# Patient Record
Sex: FEMALE | Race: WHITE | Employment: OTHER
[De-identification: names, ages, dates, MRNs, and addresses within clinical notes are randomized per-mention and may not be internally consistent; named-entity substitution may affect disease eponyms.]

---

## 2017-01-06 ENCOUNTER — CHARTING TRANS (OUTPATIENT)
Dept: OTHER | Age: 77
End: 2017-01-06

## 2017-01-10 ENCOUNTER — CHARTING TRANS (OUTPATIENT)
Dept: FAMILY MEDICINE | Age: 77
End: 2017-01-10

## 2017-01-17 ENCOUNTER — BH HISTORICAL (OUTPATIENT)
Dept: OTHER | Age: 77
End: 2017-01-17

## 2017-01-18 ENCOUNTER — CHARTING TRANS (OUTPATIENT)
Dept: OTHER | Age: 77
End: 2017-01-18

## 2017-01-25 ENCOUNTER — BH HISTORICAL (OUTPATIENT)
Dept: OTHER | Age: 77
End: 2017-01-25

## 2017-01-25 ENCOUNTER — CHARTING TRANS (OUTPATIENT)
Dept: OTHER | Age: 77
End: 2017-01-25

## 2017-02-01 ENCOUNTER — CHARTING TRANS (OUTPATIENT)
Dept: OTHER | Age: 77
End: 2017-02-01

## 2017-02-02 ENCOUNTER — CHARTING TRANS (OUTPATIENT)
Dept: FAMILY MEDICINE | Age: 77
End: 2017-02-02

## 2017-02-02 ENCOUNTER — IMAGING SERVICES (OUTPATIENT)
Dept: OTHER | Age: 77
End: 2017-02-02

## 2017-02-11 ENCOUNTER — IMAGING SERVICES (OUTPATIENT)
Dept: OTHER | Age: 77
End: 2017-02-11

## 2017-02-15 ENCOUNTER — CHARTING TRANS (OUTPATIENT)
Dept: OTHER | Age: 77
End: 2017-02-15

## 2017-02-21 ENCOUNTER — BH HISTORICAL (OUTPATIENT)
Dept: OTHER | Age: 77
End: 2017-02-21

## 2017-02-22 ENCOUNTER — CHARTING TRANS (OUTPATIENT)
Dept: OTHER | Age: 77
End: 2017-02-22

## 2017-02-23 ENCOUNTER — BH HISTORICAL (OUTPATIENT)
Dept: OTHER | Age: 77
End: 2017-02-23

## 2017-02-25 ENCOUNTER — CHARTING TRANS (OUTPATIENT)
Dept: OTHER | Age: 77
End: 2017-02-25

## 2017-03-02 ENCOUNTER — CHARTING TRANS (OUTPATIENT)
Dept: OTHER | Age: 77
End: 2017-03-02

## 2017-03-06 ENCOUNTER — CHARTING TRANS (OUTPATIENT)
Dept: OTHER | Age: 77
End: 2017-03-06

## 2017-03-06 ENCOUNTER — CHARTING TRANS (OUTPATIENT)
Dept: FAMILY MEDICINE | Age: 77
End: 2017-03-06

## 2017-03-21 ENCOUNTER — CHARTING TRANS (OUTPATIENT)
Dept: OTHER | Age: 77
End: 2017-03-21

## 2017-03-31 ENCOUNTER — CHARTING TRANS (OUTPATIENT)
Dept: FAMILY MEDICINE | Age: 77
End: 2017-03-31

## 2017-04-03 ENCOUNTER — CHARTING TRANS (OUTPATIENT)
Dept: OTHER | Age: 77
End: 2017-04-03

## 2017-04-04 ENCOUNTER — CHARTING TRANS (OUTPATIENT)
Dept: OTHER | Age: 77
End: 2017-04-04

## 2017-04-25 ENCOUNTER — CHARTING TRANS (OUTPATIENT)
Dept: OTHER | Age: 77
End: 2017-04-25

## 2017-04-27 ENCOUNTER — CHARTING TRANS (OUTPATIENT)
Dept: OTHER | Age: 77
End: 2017-04-27

## 2017-05-03 ENCOUNTER — CHARTING TRANS (OUTPATIENT)
Dept: OTHER | Age: 77
End: 2017-05-03

## 2017-06-09 ENCOUNTER — CHARTING TRANS (OUTPATIENT)
Dept: FAMILY MEDICINE | Age: 77
End: 2017-06-09

## 2017-06-19 ENCOUNTER — CHARTING TRANS (OUTPATIENT)
Dept: OTHER | Age: 77
End: 2017-06-19

## 2017-06-20 ENCOUNTER — CHARTING TRANS (OUTPATIENT)
Dept: OTHER | Age: 77
End: 2017-06-20

## 2017-06-29 ENCOUNTER — CHARTING TRANS (OUTPATIENT)
Dept: OTHER | Age: 77
End: 2017-06-29

## 2017-07-13 ENCOUNTER — CHARTING TRANS (OUTPATIENT)
Dept: OTHER | Age: 77
End: 2017-07-13

## 2017-07-13 ENCOUNTER — CHARTING TRANS (OUTPATIENT)
Dept: FAMILY MEDICINE | Age: 77
End: 2017-07-13

## 2017-08-31 ENCOUNTER — CHARTING TRANS (OUTPATIENT)
Dept: OTHER | Age: 77
End: 2017-08-31

## 2017-09-05 ENCOUNTER — CHARTING TRANS (OUTPATIENT)
Dept: OTHER | Age: 77
End: 2017-09-05

## 2017-09-07 ENCOUNTER — CHARTING TRANS (OUTPATIENT)
Dept: OTHER | Age: 77
End: 2017-09-07

## 2017-09-11 ENCOUNTER — CHARTING TRANS (OUTPATIENT)
Dept: OTHER | Age: 77
End: 2017-09-11

## 2017-09-14 ENCOUNTER — CHARTING TRANS (OUTPATIENT)
Dept: OTHER | Age: 77
End: 2017-09-14

## 2017-10-27 ENCOUNTER — LAB REQUISITION (OUTPATIENT)
Dept: LAB | Facility: HOSPITAL | Age: 77
End: 2017-10-27

## 2017-10-27 ENCOUNTER — CHARTING TRANS (OUTPATIENT)
Dept: OTHER | Age: 77
End: 2017-10-27

## 2017-10-27 DIAGNOSIS — R50.9 FEVER: ICD-10-CM

## 2017-10-27 PROCEDURE — 80053 COMPREHEN METABOLIC PANEL: CPT

## 2017-10-27 PROCEDURE — 85025 COMPLETE CBC W/AUTO DIFF WBC: CPT

## 2017-10-27 PROCEDURE — 83735 ASSAY OF MAGNESIUM: CPT

## 2017-11-16 ENCOUNTER — CHARTING TRANS (OUTPATIENT)
Dept: OTHER | Age: 77
End: 2017-11-16

## 2017-11-30 ENCOUNTER — CHARTING TRANS (OUTPATIENT)
Dept: OTHER | Age: 77
End: 2017-11-30

## 2017-12-04 ENCOUNTER — CHARTING TRANS (OUTPATIENT)
Dept: OTHER | Age: 77
End: 2017-12-04

## 2018-01-18 ENCOUNTER — CHARTING TRANS (OUTPATIENT)
Dept: OTHER | Age: 78
End: 2018-01-18

## 2018-01-22 ENCOUNTER — CHARTING TRANS (OUTPATIENT)
Dept: OTHER | Age: 78
End: 2018-01-22

## 2018-02-02 ENCOUNTER — CHARTING TRANS (OUTPATIENT)
Dept: OTHER | Age: 78
End: 2018-02-02

## 2018-02-15 ENCOUNTER — CHARTING TRANS (OUTPATIENT)
Dept: OTHER | Age: 78
End: 2018-02-15

## 2018-03-21 ENCOUNTER — CHARTING TRANS (OUTPATIENT)
Dept: OTHER | Age: 78
End: 2018-03-21

## 2018-04-02 ENCOUNTER — CHARTING TRANS (OUTPATIENT)
Dept: OTHER | Age: 78
End: 2018-04-02

## 2018-04-13 ENCOUNTER — CHARTING TRANS (OUTPATIENT)
Dept: OTHER | Age: 78
End: 2018-04-13

## 2018-04-26 ENCOUNTER — CHARTING TRANS (OUTPATIENT)
Dept: OTHER | Age: 78
End: 2018-04-26

## 2018-05-01 ENCOUNTER — CHARTING TRANS (OUTPATIENT)
Dept: OTHER | Age: 78
End: 2018-05-01

## 2018-05-04 ENCOUNTER — CHARTING TRANS (OUTPATIENT)
Dept: OTHER | Age: 78
End: 2018-05-04

## 2018-05-07 ENCOUNTER — CHARTING TRANS (OUTPATIENT)
Dept: OTHER | Age: 78
End: 2018-05-07

## 2018-05-08 ENCOUNTER — BH HISTORICAL (OUTPATIENT)
Dept: OTHER | Age: 78
End: 2018-05-08

## 2018-05-15 ENCOUNTER — BH HISTORICAL (OUTPATIENT)
Dept: OTHER | Age: 78
End: 2018-05-15

## 2018-05-21 ENCOUNTER — BH HISTORICAL (OUTPATIENT)
Dept: OTHER | Age: 78
End: 2018-05-21

## 2018-05-30 ENCOUNTER — CHARTING TRANS (OUTPATIENT)
Dept: OTHER | Age: 78
End: 2018-05-30

## 2018-06-02 ENCOUNTER — BH HISTORICAL (OUTPATIENT)
Dept: OTHER | Age: 78
End: 2018-06-02

## 2018-06-15 ENCOUNTER — BH HISTORICAL (OUTPATIENT)
Dept: OTHER | Age: 78
End: 2018-06-15

## 2018-06-16 ENCOUNTER — CHARTING TRANS (OUTPATIENT)
Dept: OTHER | Age: 78
End: 2018-06-16

## 2018-06-25 ENCOUNTER — CHARTING TRANS (OUTPATIENT)
Dept: OTHER | Age: 78
End: 2018-06-25

## 2018-06-29 ENCOUNTER — BH HISTORICAL (OUTPATIENT)
Dept: OTHER | Age: 78
End: 2018-06-29

## 2018-07-13 ENCOUNTER — BH HISTORICAL (OUTPATIENT)
Dept: OTHER | Age: 78
End: 2018-07-13

## 2018-07-24 ENCOUNTER — CHARTING TRANS (OUTPATIENT)
Dept: OTHER | Age: 78
End: 2018-07-24

## 2018-07-27 ENCOUNTER — BH HISTORICAL (OUTPATIENT)
Dept: OTHER | Age: 78
End: 2018-07-27

## 2018-08-06 ENCOUNTER — CHARTING TRANS (OUTPATIENT)
Dept: OTHER | Age: 78
End: 2018-08-06

## 2018-08-13 ENCOUNTER — CHARTING TRANS (OUTPATIENT)
Dept: OTHER | Age: 78
End: 2018-08-13

## 2018-08-20 ENCOUNTER — BH HISTORICAL (OUTPATIENT)
Dept: OTHER | Age: 78
End: 2018-08-20

## 2018-08-29 ENCOUNTER — BH HISTORICAL (OUTPATIENT)
Dept: OTHER | Age: 78
End: 2018-08-29

## 2018-08-30 ENCOUNTER — CHARTING TRANS (OUTPATIENT)
Dept: OTHER | Age: 78
End: 2018-08-30

## 2018-09-11 ENCOUNTER — CHARTING TRANS (OUTPATIENT)
Dept: OTHER | Age: 78
End: 2018-09-11

## 2018-10-15 ENCOUNTER — CHARTING TRANS (OUTPATIENT)
Dept: OTHER | Age: 78
End: 2018-10-15

## 2018-10-19 ENCOUNTER — CHARTING TRANS (OUTPATIENT)
Dept: OTHER | Age: 78
End: 2018-10-19

## 2018-11-23 ENCOUNTER — IMAGING SERVICES (OUTPATIENT)
Dept: OTHER | Age: 78
End: 2018-11-23

## 2018-11-27 ENCOUNTER — CHARTING TRANS (OUTPATIENT)
Dept: OTHER | Age: 78
End: 2018-11-27

## 2018-11-27 VITALS
DIASTOLIC BLOOD PRESSURE: 68 MMHG | OXYGEN SATURATION: 96 % | HEIGHT: 68 IN | HEART RATE: 60 BPM | SYSTOLIC BLOOD PRESSURE: 122 MMHG | TEMPERATURE: 97.3 F | WEIGHT: 132 LBS | BODY MASS INDEX: 20 KG/M2 | RESPIRATION RATE: 14 BRPM

## 2018-11-27 VITALS
SYSTOLIC BLOOD PRESSURE: 118 MMHG | TEMPERATURE: 98.2 F | WEIGHT: 137 LBS | DIASTOLIC BLOOD PRESSURE: 68 MMHG | OXYGEN SATURATION: 98 % | RESPIRATION RATE: 18 BRPM | HEART RATE: 61 BPM

## 2018-11-28 VITALS
WEIGHT: 121 LBS | SYSTOLIC BLOOD PRESSURE: 124 MMHG | HEART RATE: 72 BPM | TEMPERATURE: 98 F | RESPIRATION RATE: 20 BRPM | DIASTOLIC BLOOD PRESSURE: 72 MMHG | OXYGEN SATURATION: 98 %

## 2018-11-28 VITALS
SYSTOLIC BLOOD PRESSURE: 140 MMHG | OXYGEN SATURATION: 93 % | WEIGHT: 128 LBS | HEART RATE: 79 BPM | DIASTOLIC BLOOD PRESSURE: 68 MMHG | RESPIRATION RATE: 18 BRPM | TEMPERATURE: 97.5 F

## 2018-11-28 VITALS
OXYGEN SATURATION: 95 % | WEIGHT: 130 LBS | SYSTOLIC BLOOD PRESSURE: 141 MMHG | TEMPERATURE: 98.1 F | HEART RATE: 74 BPM | DIASTOLIC BLOOD PRESSURE: 78 MMHG | BODY MASS INDEX: 19.7 KG/M2 | HEIGHT: 68 IN | RESPIRATION RATE: 18 BRPM

## 2018-11-28 VITALS
TEMPERATURE: 98.3 F | DIASTOLIC BLOOD PRESSURE: 84 MMHG | SYSTOLIC BLOOD PRESSURE: 142 MMHG | HEART RATE: 76 BPM | WEIGHT: 127 LBS

## 2018-11-28 VITALS
SYSTOLIC BLOOD PRESSURE: 142 MMHG | DIASTOLIC BLOOD PRESSURE: 68 MMHG | WEIGHT: 123 LBS | RESPIRATION RATE: 28 BRPM | HEART RATE: 84 BPM | TEMPERATURE: 98.9 F

## 2018-11-28 VITALS
SYSTOLIC BLOOD PRESSURE: 138 MMHG | DIASTOLIC BLOOD PRESSURE: 74 MMHG | RESPIRATION RATE: 24 BRPM | HEART RATE: 92 BPM | TEMPERATURE: 97.1 F | WEIGHT: 125 LBS

## 2018-11-29 VITALS
DIASTOLIC BLOOD PRESSURE: 78 MMHG | TEMPERATURE: 98.8 F | HEART RATE: 80 BPM | RESPIRATION RATE: 18 BRPM | WEIGHT: 122 LBS | SYSTOLIC BLOOD PRESSURE: 138 MMHG

## 2018-11-29 VITALS
HEIGHT: 66 IN | BODY MASS INDEX: 19.93 KG/M2 | DIASTOLIC BLOOD PRESSURE: 76 MMHG | RESPIRATION RATE: 16 BRPM | WEIGHT: 124 LBS | SYSTOLIC BLOOD PRESSURE: 122 MMHG | HEART RATE: 80 BPM | TEMPERATURE: 98.2 F | OXYGEN SATURATION: 96 %

## 2018-12-09 PROBLEM — F33.9 RECURRENT MAJOR DEPRESSIVE DISORDER (CMD): Status: ACTIVE | Noted: 2018-12-09

## 2018-12-09 RX ORDER — CLONAZEPAM 0.5 MG/1
1 TABLET ORAL 3 TIMES DAILY PRN
COMMUNITY
Start: 2018-11-27 | End: 2019-01-01 | Stop reason: SDUPTHER

## 2018-12-09 RX ORDER — MEMANTINE HYDROCHLORIDE 5 MG/1
5 TABLET ORAL DAILY
COMMUNITY
Start: 2018-09-11

## 2018-12-09 RX ORDER — RISPERIDONE 0.5 MG/1
0.5 TABLET ORAL NIGHTLY
COMMUNITY
Start: 2018-09-11 | End: 2018-12-11 | Stop reason: ALTCHOICE

## 2018-12-09 RX ORDER — MIRTAZAPINE 30 MG/1
30 TABLET, FILM COATED ORAL NIGHTLY
COMMUNITY
Start: 2018-09-11

## 2018-12-10 SDOH — HEALTH STABILITY: MENTAL HEALTH: HOW OFTEN DO YOU HAVE A DRINK CONTAINING ALCOHOL?: NEVER

## 2018-12-11 ENCOUNTER — LAB SERVICES (OUTPATIENT)
Dept: LAB | Age: 78
End: 2018-12-11

## 2018-12-11 ENCOUNTER — OFFICE VISIT (OUTPATIENT)
Dept: FAMILY MEDICINE | Age: 78
End: 2018-12-11

## 2018-12-11 VITALS
BODY MASS INDEX: 22.2 KG/M2 | WEIGHT: 146 LBS | SYSTOLIC BLOOD PRESSURE: 140 MMHG | HEART RATE: 72 BPM | DIASTOLIC BLOOD PRESSURE: 78 MMHG | TEMPERATURE: 98.1 F

## 2018-12-11 DIAGNOSIS — F33.2 SEVERE EPISODE OF RECURRENT MAJOR DEPRESSIVE DISORDER, WITHOUT PSYCHOTIC FEATURES (CMD): Primary | ICD-10-CM

## 2018-12-11 DIAGNOSIS — E04.1 NONTOXIC SINGLE THYROID NODULE: ICD-10-CM

## 2018-12-11 DIAGNOSIS — R26.89 BALANCE PROBLEM: ICD-10-CM

## 2018-12-11 DIAGNOSIS — F41.8 ANXIETY ASSOCIATED WITH DEPRESSION: ICD-10-CM

## 2018-12-11 DIAGNOSIS — E21.3 HYPERPARATHYROIDISM (CMD): ICD-10-CM

## 2018-12-11 DIAGNOSIS — Z51.81 MEDICATION MONITORING ENCOUNTER: ICD-10-CM

## 2018-12-11 DIAGNOSIS — R41.3 MEMORY CHANGE: ICD-10-CM

## 2018-12-11 DIAGNOSIS — E04.1 THYROID NODULE: ICD-10-CM

## 2018-12-11 LAB
ALBUMIN SERPL-MCNC: 4.4 G/DL (ref 3.6–5.1)
ALP SERPL-CCNC: 100 U/L (ref 45–130)
ALT SERPL W/O P-5'-P-CCNC: 19 U/L (ref 4–38)
AST SERPL-CCNC: 22 U/L (ref 14–43)
BILIRUB SERPL-MCNC: 0.4 MG/DL (ref 0–1.3)
BUN SERPL-MCNC: 19 MG/DL (ref 7–20)
CALCIUM SERPL-MCNC: 10.6 MG/DL (ref 8.6–10.6)
CHLORIDE SERPL-SCNC: 104 MMOL/L (ref 96–107)
CO2 SERPL-SCNC: 30 MMOL/L (ref 22–32)
CREAT SERPL-MCNC: 0.7 MG/DL (ref 0.5–1.4)
DIFFERENTIAL TYPE: ABNORMAL
GFR SERPL CREATININE-BSD FRML MDRD: >60 ML/MIN/{1.73M2}
GFR SERPL CREATININE-BSD FRML MDRD: >60 ML/MIN/{1.73M2}
GLUCOSE SERPL-MCNC: 70 MG/DL (ref 70–200)
HEMATOCRIT: 41.1 % (ref 34–45)
HEMOGLOBIN: 13.8 G/DL (ref 11.2–15.7)
LYMPH PERCENT: 12.9 % (ref 20.5–51.1)
LYMPHOCYTE ABSOLUTE #: 1.2 10*3/UL (ref 1.2–3.4)
MEAN CORPUSCULAR HGB CONCENTRATION: 33.6 % (ref 32–36)
MEAN CORPUSCULAR HGB: 29.9 PG (ref 27–34)
MEAN CORPUSCULAR VOLUME: 89.2 FL (ref 79–95)
MEAN PLATELET VOLUME: 8.9 FL (ref 8.6–12.4)
MIXED %: 9 % (ref 4.3–12.9)
MIXED ABSOLUTE #: 0.9 10*3/UL (ref 0.2–0.9)
NEUTROPHIL ABSOLUTE #: 7.4 10*3/UL (ref 1.4–6.5)
NEUTROPHIL PERCENT: 78.1 % (ref 34–73.5)
PLATELET COUNT: 288 10*3/UL (ref 150–400)
POTASSIUM SERPL-SCNC: 4.8 MMOL/L (ref 3.5–5.3)
PROT SERPL-MCNC: 6.9 G/DL (ref 6.4–8.5)
RED BLOOD CELL COUNT: 4.61 10*6/UL (ref 3.7–5.2)
RED CELL DISTRIBUTION WIDTH: 12.3 % (ref 11.3–14.8)
SODIUM SERPL-SCNC: 141 MMOL/L (ref 136–146)
WHITE BLOOD CELL COUNT: 9.5 10*3/UL (ref 4–10)

## 2018-12-11 PROCEDURE — 36415 COLL VENOUS BLD VENIPUNCTURE: CPT | Performed by: FAMILY MEDICINE

## 2018-12-11 PROCEDURE — 83970 ASSAY OF PARATHORMONE: CPT | Performed by: FAMILY MEDICINE

## 2018-12-11 PROCEDURE — 84443 ASSAY THYROID STIM HORMONE: CPT | Performed by: FAMILY MEDICINE

## 2018-12-11 PROCEDURE — 80053 COMPREHEN METABOLIC PANEL: CPT | Performed by: FAMILY MEDICINE

## 2018-12-11 PROCEDURE — 99214 OFFICE O/P EST MOD 30 MIN: CPT | Performed by: FAMILY MEDICINE

## 2018-12-11 PROCEDURE — 85025 COMPLETE CBC W/AUTO DIFF WBC: CPT | Performed by: FAMILY MEDICINE

## 2018-12-11 PROCEDURE — 3078F DIAST BP <80 MM HG: CPT | Performed by: FAMILY MEDICINE

## 2018-12-11 RX ORDER — LAMOTRIGINE 25 MG/1
25 TABLET ORAL DAILY
Qty: 30 TABLET | Refills: 0 | Status: SHIPPED | OUTPATIENT
Start: 2018-12-11 | End: 2018-12-31 | Stop reason: SDUPTHER

## 2018-12-11 SDOH — HEALTH STABILITY: PHYSICAL HEALTH: ON AVERAGE, HOW MANY MINUTES DO YOU ENGAGE IN EXERCISE AT THIS LEVEL?: 20 MIN

## 2018-12-11 SDOH — HEALTH STABILITY: PHYSICAL HEALTH: ON AVERAGE, HOW MANY DAYS PER WEEK DO YOU ENGAGE IN MODERATE TO STRENUOUS EXERCISE (LIKE A BRISK WALK)?: 3 DAYS

## 2018-12-12 LAB
CALCIUM SERPL-MCNC: 10.9 MG/DL (ref 8.6–10.6)
PTH-INTACT SERPL-MCNC: 118.3 PG/ML (ref 23–73)
TSH SERPL DL<=0.05 MIU/L-ACNC: 1.69 M[IU]/L (ref 0.3–4.82)

## 2018-12-15 DIAGNOSIS — R79.89 ELEVATED PARATHYROID HORMONE RELATED PEPTIDE LEVEL: Primary | ICD-10-CM

## 2018-12-15 DIAGNOSIS — R79.89 ELEVATED PARATHYROID HORMONE: ICD-10-CM

## 2018-12-21 ENCOUNTER — IMAGING SERVICES (OUTPATIENT)
Dept: GENERAL RADIOLOGY | Age: 78
End: 2018-12-21
Attending: FAMILY MEDICINE

## 2018-12-21 DIAGNOSIS — D35.1 PARATHYROID ADENOMA: Primary | ICD-10-CM

## 2018-12-21 DIAGNOSIS — R79.89 ELEVATED PARATHYROID HORMONE: ICD-10-CM

## 2018-12-21 PROCEDURE — 78071 PARATHYRD PLANAR W/WO SUBTRJ: CPT | Performed by: RADIOLOGY

## 2018-12-21 PROCEDURE — A9500 TC99M SESTAMIBI: HCPCS

## 2018-12-21 RX ORDER — TETRAKIS(2-METHOXYISOBUTYLISOCYANIDE)COPPER(I) TETRAFLUOROBORATE 1 MG/ML
24 INJECTION, POWDER, LYOPHILIZED, FOR SOLUTION INTRAVENOUS ONCE
Status: COMPLETED | OUTPATIENT
Start: 2018-12-21 | End: 2018-12-21

## 2018-12-21 RX ADMIN — TETRAKIS(2-METHOXYISOBUTYLISOCYANIDE)COPPER(I) TETRAFLUOROBORATE 24 MILLICURIE: 1 INJECTION, POWDER, LYOPHILIZED, FOR SOLUTION INTRAVENOUS at 09:50

## 2018-12-31 ENCOUNTER — OFFICE VISIT (OUTPATIENT)
Dept: FAMILY MEDICINE | Age: 78
End: 2018-12-31

## 2018-12-31 VITALS
RESPIRATION RATE: 16 BRPM | TEMPERATURE: 98 F | WEIGHT: 146.06 LBS | BODY MASS INDEX: 22.21 KG/M2 | DIASTOLIC BLOOD PRESSURE: 80 MMHG | SYSTOLIC BLOOD PRESSURE: 140 MMHG | HEART RATE: 76 BPM

## 2018-12-31 DIAGNOSIS — F33.2 SEVERE EPISODE OF RECURRENT MAJOR DEPRESSIVE DISORDER, WITHOUT PSYCHOTIC FEATURES (CMD): ICD-10-CM

## 2018-12-31 DIAGNOSIS — F41.8 ANXIETY ASSOCIATED WITH DEPRESSION: Primary | ICD-10-CM

## 2018-12-31 DIAGNOSIS — Z23 NEED FOR INFLUENZA VACCINATION: ICD-10-CM

## 2018-12-31 DIAGNOSIS — E21.3 HYPERPARATHYROIDISM (CMD): ICD-10-CM

## 2018-12-31 PROCEDURE — 99214 OFFICE O/P EST MOD 30 MIN: CPT | Performed by: FAMILY MEDICINE

## 2018-12-31 PROCEDURE — 90686 IIV4 VACC NO PRSV 0.5 ML IM: CPT

## 2018-12-31 PROCEDURE — G0008 ADMIN INFLUENZA VIRUS VAC: HCPCS

## 2018-12-31 RX ORDER — LAMOTRIGINE 25 MG/1
25 TABLET ORAL 2 TIMES DAILY
Qty: 60 TABLET | Refills: 0 | Status: SHIPPED | OUTPATIENT
Start: 2018-12-31

## 2018-12-31 ASSESSMENT — PATIENT HEALTH QUESTIONNAIRE - PHQ9
1. LITTLE INTEREST OR PLEASURE IN DOING THINGS: SEVERAL DAYS
SUM OF ALL RESPONSES TO PHQ9 QUESTIONS 1 AND 2: 2
2. FEELING DOWN, DEPRESSED OR HOPELESS: SEVERAL DAYS

## 2019-01-01 ENCOUNTER — EXTERNAL RECORD (OUTPATIENT)
Dept: HEALTH INFORMATION MANAGEMENT | Facility: OTHER | Age: 79
End: 2019-01-01

## 2019-01-01 RX ORDER — CLONAZEPAM 0.5 MG/1
TABLET ORAL
Qty: 90 TABLET | Refills: 0 | Status: SHIPPED | OUTPATIENT
Start: 2019-01-01

## 2019-01-08 ENCOUNTER — OFFICE VISIT (OUTPATIENT)
Dept: OPHTHALMOLOGY | Age: 79
End: 2019-01-08

## 2019-01-08 DIAGNOSIS — H25.12 CATARACT, NUCLEAR SCLEROTIC SENILE, LEFT: Primary | ICD-10-CM

## 2019-01-08 DIAGNOSIS — H04.123 DRY EYES, BILATERAL: ICD-10-CM

## 2019-01-08 PROCEDURE — 92004 COMPRE OPH EXAM NEW PT 1/>: CPT | Performed by: OPHTHALMOLOGY

## 2019-01-09 ENCOUNTER — OFFICE VISIT (OUTPATIENT)
Dept: OTOLARYNGOLOGY | Age: 79
End: 2019-01-09
Attending: FAMILY MEDICINE

## 2019-01-09 VITALS — HEIGHT: 69 IN | BODY MASS INDEX: 21.48 KG/M2 | WEIGHT: 145 LBS

## 2019-01-09 DIAGNOSIS — E21.3 HYPERPARATHYROIDISM (CMD): Primary | ICD-10-CM

## 2019-01-09 PROCEDURE — 99204 OFFICE O/P NEW MOD 45 MIN: CPT | Performed by: OTOLARYNGOLOGY

## 2019-01-11 ENCOUNTER — APPOINTMENT (OUTPATIENT)
Dept: OPTOMETRY | Age: 79
End: 2019-01-11

## 2019-01-14 ENCOUNTER — TELEPHONE (OUTPATIENT)
Dept: FAMILY MEDICINE | Age: 79
End: 2019-01-14

## 2019-01-15 ENCOUNTER — TELEPHONE (OUTPATIENT)
Dept: FAMILY MEDICINE | Age: 79
End: 2019-01-15

## 2019-01-15 ENCOUNTER — NURSE ONLY (OUTPATIENT)
Dept: FAMILY MEDICINE | Age: 79
End: 2019-01-15

## 2019-01-15 DIAGNOSIS — J18.9 PNEUMONIA OF RIGHT LOWER LOBE DUE TO INFECTIOUS ORGANISM: Primary | ICD-10-CM

## 2019-01-15 DIAGNOSIS — R41.3 MEMORY DEFICIT: Primary | ICD-10-CM

## 2019-01-15 DIAGNOSIS — Z11.1 SCREENING EXAMINATION FOR PULMONARY TUBERCULOSIS: ICD-10-CM

## 2019-01-15 PROCEDURE — 86580 TB INTRADERMAL TEST: CPT

## 2019-01-15 PROCEDURE — 99211 OFF/OP EST MAY X REQ PHY/QHP: CPT | Performed by: FAMILY MEDICINE

## 2019-01-15 RX ORDER — AZITHROMYCIN 250 MG/1
TABLET, FILM COATED ORAL
Qty: 6 TABLET | Refills: 0 | Status: SHIPPED | OUTPATIENT
Start: 2019-01-15

## 2019-01-17 ENCOUNTER — APPOINTMENT (OUTPATIENT)
Dept: ULTRASOUND IMAGING | Age: 79
End: 2019-01-17

## 2019-01-17 LAB
INDURATION: 0 MM (ref 0–?)
SKIN TEST RESULT: NEGATIVE

## 2019-01-22 ENCOUNTER — APPOINTMENT (OUTPATIENT)
Dept: FAMILY MEDICINE | Age: 79
End: 2019-01-22

## 2019-03-05 VITALS
RESPIRATION RATE: 18 BRPM | HEART RATE: 72 BPM | WEIGHT: 136 LBS | BODY MASS INDEX: 20.68 KG/M2 | DIASTOLIC BLOOD PRESSURE: 86 MMHG | TEMPERATURE: 98.4 F | SYSTOLIC BLOOD PRESSURE: 142 MMHG

## 2019-03-06 VITALS
HEART RATE: 84 BPM | DIASTOLIC BLOOD PRESSURE: 82 MMHG | OXYGEN SATURATION: 98 % | SYSTOLIC BLOOD PRESSURE: 124 MMHG | RESPIRATION RATE: 20 BRPM | WEIGHT: 137 LBS | BODY MASS INDEX: 20.83 KG/M2 | TEMPERATURE: 98 F

## 2019-03-06 VITALS
HEART RATE: 76 BPM | BODY MASS INDEX: 20.22 KG/M2 | RESPIRATION RATE: 18 BRPM | TEMPERATURE: 98.2 F | DIASTOLIC BLOOD PRESSURE: 84 MMHG | WEIGHT: 133 LBS | OXYGEN SATURATION: 96 % | SYSTOLIC BLOOD PRESSURE: 140 MMHG

## 2019-03-06 VITALS
BODY MASS INDEX: 22.02 KG/M2 | DIASTOLIC BLOOD PRESSURE: 80 MMHG | RESPIRATION RATE: 24 BRPM | SYSTOLIC BLOOD PRESSURE: 134 MMHG | WEIGHT: 137 LBS | HEART RATE: 88 BPM | OXYGEN SATURATION: 98 % | HEIGHT: 66 IN | TEMPERATURE: 98.7 F

## 2019-03-06 VITALS
TEMPERATURE: 97.1 F | DIASTOLIC BLOOD PRESSURE: 84 MMHG | SYSTOLIC BLOOD PRESSURE: 128 MMHG | RESPIRATION RATE: 16 BRPM | OXYGEN SATURATION: 95 % | HEART RATE: 81 BPM

## 2019-03-10 ENCOUNTER — EXTERNAL RECORD (OUTPATIENT)
Dept: OTHER | Age: 79
End: 2019-03-10

## 2019-03-10 ENCOUNTER — EXTERNAL RECORD (OUTPATIENT)
Dept: NEPHROLOGY | Age: 79
End: 2019-03-10

## 2019-03-10 LAB
ANION GAP: 8 MEQ/L (ref 8–16)
BLOOD UREA NITROGEN (BUN): 73 MG/DL (ref 7–25)
BUN/CREATININE RATIO: 41.5 (ref 7.4–23)
CALCIUM, SERUM: 10.1 MG/DL (ref 8.6–10.3)
CARBON DIOXIDE: 31 MMOL/L (ref 21–31)
CHLORIDE, SERUM: 122 MMOL/L (ref 98–107)
CREATININE: 1.76 MG/DL (ref 0.6–1.2)
EST GLOMERULAR FILTRATION RATE: 28 1.73M SQ
GLUCOSE: 109 MG/DL (ref 70–99)
K (POTASSIUM, SERUM): 3.9 MMOL/L (ref 3.5–5.1)
NA (SODIUM, SERUM): 161 MMOL/L (ref 133–144)

## 2019-03-11 LAB
*MEAN CORPUSCULAR HGB CONC: 32.4 G/DL (ref 32.5–35.8)
A/G RATIO: 1.5 (ref 1.1–2.4)
ALANINE AMINOTRANSFE: 45 U/L (ref 7–52)
ALBUMIN, SERUM (ALB): 3.9 G/DL (ref 3.5–5.7)
ALKALINE PHOSPHATASE (ALK): 99 U/L (ref 34–104)
ANION GAP: 6 MEQ/L (ref 8–16)
ANION GAP: 7 MEQ/L (ref 8–16)
ANION GAP: 8 MEQ/L (ref 8–16)
ASPARTATE AMINOTRANS: 35 U/L (ref 13–39)
BASOPHIL AUTOMATED: 0.4 %
BASOPHILS: 0 (ref 0–0.2)
BILIRUBIN, DIRECT (CONJUGATED): 0.14 MG/DL (ref 0.03–0.18)
BILIRUBIN, TOTAL: 0.6 MG/DL (ref 0.2–0.8)
BLOOD UREA NITROGEN (BUN): 44 MG/DL (ref 7–25)
BLOOD UREA NITROGEN (BUN): 50 MG/DL (ref 7–25)
BLOOD UREA NITROGEN (BUN): 58 MG/DL (ref 7–25)
BUN/CREATININE RATIO: 36.1 (ref 7.4–23)
BUN/CREATININE RATIO: 45.5 (ref 7.4–23)
BUN/CREATININE RATIO: 46 (ref 7.4–23)
CALCIUM, SERUM: 10.2 MG/DL (ref 8.6–10.3)
CALCIUM, SERUM: 9.7 MG/DL (ref 8.6–10.3)
CALCIUM, SERUM: 9.9 MG/DL (ref 8.6–10.3)
CARBON DIOXIDE: 27 MMOL/L (ref 21–31)
CARBON DIOXIDE: 29 MMOL/L (ref 21–31)
CARBON DIOXIDE: 30 MMOL/L (ref 21–31)
CHLORIDE, SERUM: 117 MMOL/L (ref 98–107)
CHLORIDE, SERUM: 121 MMOL/L (ref 98–107)
CHLORIDE, SERUM: 123 MMOL/L (ref 98–107)
CREATININE: 1.1 MG/DL (ref 0.6–1.2)
CREATININE: 1.22 MG/DL (ref 0.6–1.2)
CREATININE: 1.26 MG/DL (ref 0.6–1.2)
EOSINOPHIL AUTOMATED: 4.2 %
EOSINOPHILS: 0.4 (ref 0–0.5)
EST GLOMERULAR FILTRATION RATE: 41 1.73M SQ
EST GLOMERULAR FILTRATION RATE: 42 1.73M SQ
EST GLOMERULAR FILTRATION RATE: 48 1.73M SQ
GLUCOSE: 125 MG/DL (ref 70–99)
GLUCOSE: 131 MG/DL (ref 70–99)
GLUCOSE: 86 MG/DL (ref 70–99)
HEMATOCRIT: 43.5 % (ref 34.7–45.1)
HEMOGLOBIN: 14.1 GM/DL (ref 12–15.3)
K (POTASSIUM, SERUM): 3.8 MMOL/L (ref 3.5–5.1)
K (POTASSIUM, SERUM): 4.1 MMOL/L (ref 3.5–5.1)
K (POTASSIUM, SERUM): 4.1 MMOL/L (ref 3.5–5.1)
LYMPHOCYTE AUTOMATED: 12.8 %
LYMPHOCYTES: 1.2 (ref 0.6–3.4)
MEAN CORPUSCULAR HGB: 29 PG (ref 26–34)
MEAN CORPUSCULAR VOL: 89.3 FL (ref 80–100)
MEAN PLATELET VOLUME: 8.8 FL (ref 6.8–10.2)
MONOCYTE AUTOMATED: 9.6 %
MONOCYTES: 0.9 (ref 0.3–1)
MRSA SCREEN, PCR: NEGATIVE
MRSA SOURCE: NORMAL
NA (SODIUM, SERUM): 152 MMOL/L (ref 133–144)
NA (SODIUM, SERUM): 155 MMOL/L (ref 133–144)
NA (SODIUM, SERUM): 161 MMOL/L (ref 133–144)
NEUTROPHIL ABSOLUTE: 7 (ref 1.7–7.7)
NEUTROPHIL AUTOMATED: 73 %
PLATELET COUNT: 356 K/MM3 (ref 150–450)
PROTEIN TOTAL: 6.5 G/DL (ref 6.4–8.9)
RED BLOOD CELL COUNT: 4.87 M/MM3 (ref 3.63–5.04)
RED CELL DISTRIBUTIO: 15.9 % (ref 11.9–15.9)
WHITE BLOOD CELL COU: 9.6 K/MM3 (ref 4–11)

## 2019-03-12 LAB
*MEAN CORPUSCULAR HGB CONC: 32.9 G/DL (ref 32.5–35.8)
ANION GAP: 8 MEQ/L (ref 8–16)
BASOPHIL AUTOMATED: 0.9 %
BASOPHILS: 0.1 (ref 0–0.2)
BLOOD UREA NITROGEN (BUN): 31 MG/DL (ref 7–25)
BUN/CREATININE RATIO: 34.4 (ref 7.4–23)
CALCIUM, SERUM: 10.2 MG/DL (ref 8.6–10.3)
CARBON DIOXIDE: 27 MMOL/L (ref 21–31)
CHLORIDE, SERUM: 114 MMOL/L (ref 98–107)
CREATININE: 0.9 MG/DL (ref 0.6–1.2)
EOSINOPHIL AUTOMATED: 6.1 %
EOSINOPHILS: 0.5 (ref 0–0.5)
EST GLOMERULAR FILTRATION RATE: 60 1.73M SQ
GLUCOSE: 88 MG/DL (ref 70–99)
HEMATOCRIT: 44.3 % (ref 34.7–45.1)
HEMOGLOBIN: 14.6 GM/DL (ref 12–15.3)
K (POTASSIUM, SERUM): 4.1 MMOL/L (ref 3.5–5.1)
LYMPHOCYTE AUTOMATED: 16.5 %
LYMPHOCYTES: 1.4 (ref 0.6–3.4)
MEAN CORPUSCULAR HGB: 29.4 PG (ref 26–34)
MEAN CORPUSCULAR VOL: 89.6 FL (ref 80–100)
MEAN PLATELET VOLUME: 8.3 FL (ref 6.8–10.2)
MONOCYTE AUTOMATED: 8.6 %
MONOCYTES: 0.7 (ref 0.3–1)
NA (SODIUM, SERUM): 149 MMOL/L (ref 133–144)
NEUTROPHIL ABSOLUTE: 5.8 (ref 1.7–7.7)
NEUTROPHIL AUTOMATED: 67.9 %
PLATELET COUNT: 329 K/MM3 (ref 150–450)
RED BLOOD CELL COUNT: 4.94 M/MM3 (ref 3.63–5.04)
RED CELL DISTRIBUTIO: 14.5 % (ref 11.9–15.9)
VANCOMYCIN RANDOM LD DATE: NORMAL
VANCOMYCIN RANDOM LD TIME: 2230
VANCOMYCIN, RANDOM: 10.5 UG/ML (ref 10–40)
WHITE BLOOD CELL COU: 8.6 K/MM3 (ref 4–11)

## 2019-03-13 LAB
*MEAN CORPUSCULAR HGB CONC: 32.6 G/DL (ref 32.5–35.8)
ANION GAP: 4 MEQ/L (ref 8–16)
BASOPHIL AUTOMATED: 0.6 %
BASOPHILS: 0 (ref 0–0.2)
BLOOD UREA NITROGEN (BUN): 21 MG/DL (ref 7–25)
BUN/CREATININE RATIO: 28 (ref 7.4–23)
CALCIUM, SERUM: 9.9 MG/DL (ref 8.6–10.3)
CARBON DIOXIDE: 27 MMOL/L (ref 21–31)
CHLORIDE, SERUM: 114 MMOL/L (ref 98–107)
CREATININE: 0.75 MG/DL (ref 0.6–1.2)
EOSINOPHIL AUTOMATED: 6.4 %
EOSINOPHILS: 0.5 (ref 0–0.5)
EST GLOMERULAR FILTRATION RATE: > 60 1.73M SQ
GLUCOSE: 89 MG/DL (ref 70–99)
HEMATOCRIT: 41.9 % (ref 34.7–45.1)
HEMOGLOBIN: 13.7 GM/DL (ref 12–15.3)
K (POTASSIUM, SERUM): 3.9 MMOL/L (ref 3.5–5.1)
LYMPHOCYTE AUTOMATED: 11.6 %
LYMPHOCYTES: 0.9 (ref 0.6–3.4)
MEAN CORPUSCULAR HGB: 28.9 PG (ref 26–34)
MEAN CORPUSCULAR VOL: 88.7 FL (ref 80–100)
MEAN PLATELET VOLUME: 8.5 FL (ref 6.8–10.2)
MONOCYTE AUTOMATED: 9.2 %
MONOCYTES: 0.7 (ref 0.3–1)
NA (SODIUM, SERUM): 145 MMOL/L (ref 133–144)
NEUTROPHIL ABSOLUTE: 5.4 (ref 1.7–7.7)
NEUTROPHIL AUTOMATED: 72.2 %
PLATELET COUNT: 270 K/MM3 (ref 150–450)
RED BLOOD CELL COUNT: 4.72 M/MM3 (ref 3.63–5.04)
RED CELL DISTRIBUTIO: 14.5 % (ref 11.9–15.9)
VANCOMYCIN TROUGH LD DATE: NORMAL
VANCOMYCIN TROUGH LD TIME: 1030
VANCOMYCIN TROUGH: 14.5 UG/ML (ref 10–20)
WHITE BLOOD CELL COU: 7.5 K/MM3 (ref 4–11)

## 2019-03-14 LAB
*MEAN CORPUSCULAR HGB CONC: 33.2 G/DL (ref 32.5–35.8)
ANION GAP: 4 MEQ/L (ref 8–16)
BASOPHIL AUTOMATED: 0.9 %
BASOPHILS: 0.1 (ref 0–0.2)
BLOOD UREA NITROGEN (BUN): 15 MG/DL (ref 7–25)
BUN/CREATININE RATIO: 25.9 (ref 7.4–23)
CALCIUM, SERUM: 9.7 MG/DL (ref 8.6–10.3)
CARBON DIOXIDE: 26 MMOL/L (ref 21–31)
CHLORIDE, SERUM: 114 MMOL/L (ref 98–107)
CREATININE: 0.58 MG/DL (ref 0.6–1.2)
EOSINOPHIL AUTOMATED: 7 %
EOSINOPHILS: 0.4 (ref 0–0.5)
EST GLOMERULAR FILTRATION RATE: > 60 1.73M SQ
GLUCOSE: 91 MG/DL (ref 70–99)
HEMATOCRIT: 39.2 % (ref 34.7–45.1)
HEMOGLOBIN: 13 GM/DL (ref 12–15.3)
K (POTASSIUM, SERUM): 3.9 MMOL/L (ref 3.5–5.1)
LYMPHOCYTE AUTOMATED: 20.7 %
LYMPHOCYTES: 1.2 (ref 0.6–3.4)
MEAN CORPUSCULAR HGB: 29.3 PG (ref 26–34)
MEAN CORPUSCULAR VOL: 88.2 FL (ref 80–100)
MEAN PLATELET VOLUME: 8.4 FL (ref 6.8–10.2)
MONOCYTE AUTOMATED: 15.1 %
MONOCYTES: 0.9 (ref 0.3–1)
NA (SODIUM, SERUM): 144 MMOL/L (ref 133–144)
NEUTROPHIL ABSOLUTE: 3.4 (ref 1.7–7.7)
NEUTROPHIL AUTOMATED: 56.3 %
PLATELET COUNT: 228 K/MM3 (ref 150–450)
RED BLOOD CELL COUNT: 4.44 M/MM3 (ref 3.63–5.04)
RED CELL DISTRIBUTIO: 14.2 % (ref 11.9–15.9)
WHITE BLOOD CELL COU: 6 K/MM3 (ref 4–11)

## 2019-03-15 LAB
ANION GAP: 2 MEQ/L (ref 8–16)
APPEARANCE: CLEAR
ASCORBIC ACID COMMENT, URINE: NORMAL
BILIRUBIN: ABNORMAL
BLOOD UREA NITROGEN (BUN): 12 MG/DL (ref 7–25)
BUN/CREATININE RATIO: 22.2 (ref 7.4–23)
CALCIUM, SERUM: 9.5 MG/DL (ref 8.6–10.3)
CARBON DIOXIDE: 26 MMOL/L (ref 21–31)
CHLORIDE, SERUM: 114 MMOL/L (ref 98–107)
COLOR: YELLOW
CREATININE: 0.54 MG/DL (ref 0.6–1.2)
CULTURE REFLEX COMMENT: NO
EST GLOMERULAR FILTRATION RATE: > 60 1.73M SQ
GLUCOSE, URINE, AUTOMATED: 50 MG/DL
GLUCOSE: 91 MG/DL (ref 70–99)
K (POTASSIUM, SERUM): 3.6 MMOL/L (ref 3.5–5.1)
KETONES, URINE, AUTOMATED: ABNORMAL MG/DL
LEUKOCYTE, URINE, AUTOMATED: ABNORMAL
NA (SODIUM, SERUM): 142 MMOL/L (ref 133–144)
NITRITE, URINE AUTOMATED: NEGATIVE
PH, URINE: 7 (ref 5–8)
PROTEIN, URINE: ABNORMAL MG/DL
SPECIFIC GRAVITY UA: 1.01 (ref 1–1.03)
URINE, BLOOD, AUTOMATED: ABNORMAL
UROBILINOGEN, URINE, AUTOMATED: ABNORMAL MG/DL

## 2019-03-16 LAB
ANION GAP: 7 MEQ/L (ref 8–16)
BLOOD UREA NITROGEN (BUN): 15 MG/DL (ref 7–25)
BUN/CREATININE RATIO: 27.3 (ref 7.4–23)
CALCIUM, SERUM: 9.9 MG/DL (ref 8.6–10.3)
CARBON DIOXIDE: 27 MMOL/L (ref 21–31)
CHLORIDE, SERUM: 109 MMOL/L (ref 98–107)
CREATININE: 0.55 MG/DL (ref 0.6–1.2)
EST GLOMERULAR FILTRATION RATE: > 60 1.73M SQ
GLUCOSE: 88 MG/DL (ref 70–99)
K (POTASSIUM, SERUM): 3.7 MMOL/L (ref 3.5–5.1)
NA (SODIUM, SERUM): 143 MMOL/L (ref 133–144)

## 2019-03-18 ENCOUNTER — EXTERNAL RECORD (OUTPATIENT)
Dept: OTHER | Age: 79
End: 2019-03-18

## 2019-03-18 LAB
*MEAN CORPUSCULAR HGB CONC: 33.5 G/DL (ref 32.5–35.8)
ANION GAP: 5 MEQ/L (ref 8–16)
BASOPHIL AUTOMATED: 1 %
BASOPHILS: 0.1 (ref 0–0.2)
BLOOD UREA NITROGEN (BUN): 16 MG/DL (ref 7–25)
BUN/CREATININE RATIO: 27.6 (ref 7.4–23)
CALCIUM, SERUM: 9.7 MG/DL (ref 8.6–10.3)
CARBON DIOXIDE: 29 MMOL/L (ref 21–31)
CHLORIDE, SERUM: 107 MMOL/L (ref 98–107)
CREATININE: 0.58 MG/DL (ref 0.6–1.2)
EOSINOPHIL AUTOMATED: 5.2 %
EOSINOPHILS: 0.3 (ref 0–0.5)
EST GLOMERULAR FILTRATION RATE: > 60 1.73M SQ
GLUCOSE: 87 MG/DL (ref 70–99)
HEMATOCRIT: 35 % (ref 34.7–45.1)
HEMOGLOBIN: 11.7 GM/DL (ref 12–15.3)
K (POTASSIUM, SERUM): 4 MMOL/L (ref 3.5–5.1)
LYMPHOCYTE AUTOMATED: 23 %
LYMPHOCYTES: 1.5 (ref 0.6–3.4)
MEAN CORPUSCULAR HGB: 29.4 PG (ref 26–34)
MEAN CORPUSCULAR VOL: 87.6 FL (ref 80–100)
MEAN PLATELET VOLUME: 8.8 FL (ref 6.8–10.2)
MONOCYTE AUTOMATED: 12.4 %
MONOCYTES: 0.8 (ref 0.3–1)
NA (SODIUM, SERUM): 141 MMOL/L (ref 133–144)
NEUTROPHIL ABSOLUTE: 3.8 (ref 1.7–7.7)
NEUTROPHIL AUTOMATED: 58.4 %
PLATELET COUNT: 219 K/MM3 (ref 150–450)
RED BLOOD CELL COUNT: 4 M/MM3 (ref 3.63–5.04)
RED CELL DISTRIBUTIO: 14.4 % (ref 11.9–15.9)
WHITE BLOOD CELL COU: 6.5 K/MM3 (ref 4–11)

## 2019-03-19 ENCOUNTER — TELEPHONE (OUTPATIENT)
Dept: ORTHOPEDICS | Age: 79
End: 2019-03-19

## 2019-03-19 ENCOUNTER — TELEPHONE (OUTPATIENT)
Dept: FAMILY MEDICINE | Age: 79
End: 2019-03-19

## 2019-05-13 ENCOUNTER — TELEPHONE (OUTPATIENT)
Dept: FAMILY MEDICINE | Age: 79
End: 2019-05-13

## 2019-09-18 ENCOUNTER — TELEPHONE (OUTPATIENT)
Dept: FAMILY MEDICINE | Age: 79
End: 2019-09-18

## 2019-09-18 DIAGNOSIS — Z23 NEED FOR PNEUMOCOCCAL VACCINE: Primary | ICD-10-CM

## 2019-09-19 DIAGNOSIS — Z23 NEED FOR PNEUMOCOCCAL VACCINE: Primary | ICD-10-CM

## 2020-02-25 ENCOUNTER — APPOINTMENT (OUTPATIENT)
Dept: CT IMAGING | Facility: HOSPITAL | Age: 80
End: 2020-02-25
Attending: EMERGENCY MEDICINE
Payer: MEDICARE

## 2020-02-25 PROBLEM — F03.90 MAJOR NEUROCOGNITIVE DISORDER (HCC): Status: ACTIVE | Noted: 2020-02-25

## 2020-02-25 PROCEDURE — 70450 CT HEAD/BRAIN W/O DYE: CPT | Performed by: EMERGENCY MEDICINE

## 2020-02-25 NOTE — BH LEVEL OF CARE ASSESSMENT
Level of Care Assessment Note    General Questions  Why are you here?: Per staff at the [facility], patient was not able to be redirected, combative to residents and staff, and refusing care today.    Precipitating Events:  Patient has been lving at [facility] in sleep and not wake up? (past 30 days): Yes  2. Have you actually had any thoughts of killing yourself? (past 30 days): No  6.  Have you ever done anything, started to do anything, or prepared to do anything to end your life? (lifetime): No  Score -  OV: 1 access to means/firearms/weapons: collateral confirms no access to firearms/ weapons    Self Injury  History of Self Injurious Behaviors: No  Present Self-Injurious Behaviors: No    Mental Health Symptoms  Hallucination Type: No problems reported or observ mobility?: No  Physical Limitations Present: None  Independent in ambulation?: Yes  Transfer Assist: Standby Assist(AO 1-2 , one person assist due to refusing care)  Assistive Device Used[de-identified] Walker  History of falls?: Yes  When was the most recent fall?: 6 have you used/abused?: Denies                                                                Support for Recovery  Is your living environment a supportive place for recovery?: Yes  Describe: nursing home staff     Withdrawal Symptoms  History of Withdrawal Hostile  Speech  Rate of Speech: Other (comment)(RAYSHAWN)  Flow of Speech: Other (comment)(RAYSHAWN)  Intensity of Volume: (RAYSHAWN)  Clarity: Other (comment)(RAYSHAWN)  Cognition  Concentration: Impaired  Memory: Unable to assess  Orientation Level: Oriented to person; Diso Pre-Contemplative    Level of Care Recommendations  Consulted with: Dr. Varsha Cohen @ 3:26pm  Level of Care Recommendation: Inpatient Acute Care  Unit: [facility]  Reason for Unit Assigned: age and sx  Inpatient Criteria: 24 hr behavior monitoring;Suicidal

## 2020-02-25 NOTE — ED PROVIDER NOTES
Patient Seen in: [facility] Emergency Department      History   Patient presents with:  Eval-P    Stated Complaint: Eval-p    HPI  78year-old patient with a history of dementia and depression as well as some anxiety and asthma presents to the emerg Temp [facility] 02/25/20 1221 Temporal   SpO2 --    O2 Device --        Current:/57   Pulse 54   Temp 98.3 °F (36.8 °C) (Temporal)   Resp 13   Wt 54.4 kg         Physical Exam    Thin 78year-old woman lying on an emergency department bed she is very anim as of Feb 25 1514  ------------------------------------------------------------  Time: 02/25 1503  Value: Potassium(!): 3.4  Comment: (Reviewed)  ------------------------------------------------------------  Time: 02/25 1503  Comment: After 2 mg IM of savannah this patient.
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
## 2020-02-26 PROBLEM — G30.9 MAJOR NEUROCOGNITIVE DISORDER DUE TO ALZHEIMER'S DISEASE, WITH BEHAVIORAL DISTURBANCE (HCC): Status: ACTIVE | Noted: 2020-02-25

## 2020-02-26 PROBLEM — F02.81 MAJOR NEUROCOGNITIVE DISORDER DUE TO ALZHEIMER'S DISEASE, WITH BEHAVIORAL DISTURBANCE (HCC): Status: ACTIVE | Noted: 2020-02-25

## 2020-02-26 NOTE — ED NOTES
Nurse to Nurse report called to Joe Arellano. Pt going to [facility] Room 914. Ambulance transport being arranged. Pt sleeping on cart VSS. Remains on seclusion will continue to monitor.

## 2020-02-29 ENCOUNTER — APPOINTMENT (OUTPATIENT)
Dept: GENERAL RADIOLOGY | Facility: HOSPITAL | Age: 80
End: 2020-02-29
Attending: EMERGENCY MEDICINE
Payer: MEDICARE

## 2020-02-29 ENCOUNTER — HOSPITAL ENCOUNTER (EMERGENCY)
Facility: HOSPITAL | Age: 80
Discharge: NURSING, REHAB OR LONG-TERM CARE | End: 2020-02-29
Attending: EMERGENCY MEDICINE
Payer: MEDICARE

## 2020-02-29 ENCOUNTER — APPOINTMENT (OUTPATIENT)
Dept: CT IMAGING | Facility: HOSPITAL | Age: 80
End: 2020-02-29
Attending: EMERGENCY MEDICINE
Payer: MEDICARE

## 2020-02-29 VITALS
DIASTOLIC BLOOD PRESSURE: 74 MMHG | OXYGEN SATURATION: 95 % | HEART RATE: 74 BPM | SYSTOLIC BLOOD PRESSURE: 111 MMHG | RESPIRATION RATE: 16 BRPM

## 2020-02-29 DIAGNOSIS — S22.31XA CLOSED FRACTURE OF ONE RIB OF RIGHT SIDE, INITIAL ENCOUNTER: Primary | ICD-10-CM

## 2020-02-29 LAB
ALBUMIN SERPL-MCNC: 4.1 G/DL (ref 3.4–5)
ALBUMIN/GLOB SERPL: 1.3 {RATIO} (ref 1–2)
ALP LIVER SERPL-CCNC: 76 U/L (ref 55–142)
ALT SERPL-CCNC: 33 U/L (ref 13–56)
ANION GAP SERPL CALC-SCNC: 6 MMOL/L (ref 0–18)
AST SERPL-CCNC: 32 U/L (ref 15–37)
BASOPHILS # BLD AUTO: 0.07 X10(3) UL (ref 0–0.2)
BASOPHILS NFR BLD AUTO: 0.6 %
BILIRUB SERPL-MCNC: 0.6 MG/DL (ref 0.1–2)
BUN BLD-MCNC: 24 MG/DL (ref 7–18)
BUN/CREAT SERPL: 23.1 (ref 10–20)
CALCIUM BLD-MCNC: 10.9 MG/DL (ref 8.5–10.1)
CHLORIDE SERPL-SCNC: 101 MMOL/L (ref 98–112)
CO2 SERPL-SCNC: 32 MMOL/L (ref 21–32)
CREAT BLD-MCNC: 1.04 MG/DL (ref 0.55–1.02)
DEPRECATED RDW RBC AUTO: 46.5 FL (ref 35.1–46.3)
EOSINOPHIL # BLD AUTO: 0.13 X10(3) UL (ref 0–0.7)
EOSINOPHIL NFR BLD AUTO: 1.1 %
ERYTHROCYTE [DISTWIDTH] IN BLOOD BY AUTOMATED COUNT: 13.4 % (ref 11–15)
GLOBULIN PLAS-MCNC: 3.2 G/DL (ref 2.8–4.4)
GLUCOSE BLD-MCNC: 91 MG/DL (ref 70–99)
HCT VFR BLD AUTO: 48.7 % (ref 35–48)
HGB BLD-MCNC: 16.1 G/DL (ref 12–16)
IMM GRANULOCYTES # BLD AUTO: 0.05 X10(3) UL (ref 0–1)
IMM GRANULOCYTES NFR BLD: 0.4 %
LYMPHOCYTES # BLD AUTO: 1.95 X10(3) UL (ref 1–4)
LYMPHOCYTES NFR BLD AUTO: 17 %
M PROTEIN MFR SERPL ELPH: 7.3 G/DL (ref 6.4–8.2)
MCH RBC QN AUTO: 31.3 PG (ref 26–34)
MCHC RBC AUTO-ENTMCNC: 33.1 G/DL (ref 31–37)
MCV RBC AUTO: 94.6 FL (ref 80–100)
MONOCYTES # BLD AUTO: 1.2 X10(3) UL (ref 0.1–1)
MONOCYTES NFR BLD AUTO: 10.5 %
NEUTROPHILS # BLD AUTO: 8.08 X10 (3) UL (ref 1.5–7.7)
NEUTROPHILS # BLD AUTO: 8.08 X10(3) UL (ref 1.5–7.7)
NEUTROPHILS NFR BLD AUTO: 70.4 %
OSMOLALITY SERPL CALC.SUM OF ELEC: 292 MOSM/KG (ref 275–295)
PLATELET # BLD AUTO: 223 10(3)UL (ref 150–450)
POTASSIUM SERPL-SCNC: 4.8 MMOL/L (ref 3.5–5.1)
RBC # BLD AUTO: 5.15 X10(6)UL (ref 3.8–5.3)
SODIUM SERPL-SCNC: 139 MMOL/L (ref 136–145)
WBC # BLD AUTO: 11.5 X10(3) UL (ref 4–11)

## 2020-02-29 PROCEDURE — 72100 X-RAY EXAM L-S SPINE 2/3 VWS: CPT | Performed by: EMERGENCY MEDICINE

## 2020-02-29 PROCEDURE — 96376 TX/PRO/DX INJ SAME DRUG ADON: CPT

## 2020-02-29 PROCEDURE — 96361 HYDRATE IV INFUSION ADD-ON: CPT

## 2020-02-29 PROCEDURE — 71101 X-RAY EXAM UNILAT RIBS/CHEST: CPT | Performed by: EMERGENCY MEDICINE

## 2020-02-29 PROCEDURE — 99284 EMERGENCY DEPT VISIT MOD MDM: CPT

## 2020-02-29 PROCEDURE — 74177 CT ABD & PELVIS W/CONTRAST: CPT | Performed by: EMERGENCY MEDICINE

## 2020-02-29 PROCEDURE — 73522 X-RAY EXAM HIPS BI 3-4 VIEWS: CPT | Performed by: EMERGENCY MEDICINE

## 2020-02-29 PROCEDURE — 96374 THER/PROPH/DIAG INJ IV PUSH: CPT

## 2020-02-29 PROCEDURE — 80053 COMPREHEN METABOLIC PANEL: CPT | Performed by: EMERGENCY MEDICINE

## 2020-02-29 PROCEDURE — 85025 COMPLETE CBC W/AUTO DIFF WBC: CPT | Performed by: EMERGENCY MEDICINE

## 2020-02-29 PROCEDURE — 99285 EMERGENCY DEPT VISIT HI MDM: CPT

## 2020-02-29 RX ORDER — LORAZEPAM 2 MG/ML
INJECTION INTRAMUSCULAR
Status: DISCONTINUED
Start: 2020-02-29 | End: 2020-02-29

## 2020-02-29 RX ORDER — LORAZEPAM 2 MG/ML
1 INJECTION INTRAMUSCULAR ONCE
Status: COMPLETED | OUTPATIENT
Start: 2020-02-29 | End: 2020-02-29

## 2020-02-29 RX ORDER — LORAZEPAM 2 MG/ML
INJECTION INTRAMUSCULAR
Status: COMPLETED
Start: 2020-02-29 | End: 2020-02-29

## 2020-02-29 RX ORDER — LORAZEPAM 2 MG/ML
2 INJECTION INTRAMUSCULAR ONCE
Status: COMPLETED | OUTPATIENT
Start: 2020-02-29 | End: 2020-02-29

## 2020-03-01 NOTE — ED PROVIDER NOTES
Patient Seen in: [facility] Emergency Department      History   Patient presents with:  Fall    Stated Complaint: pain    HPI    78-year-old female presents from [place] where she was admitted with dementia and agitation recently after a fall re for the following components:       Result Value    BUN 24 (*)     Creatinine 1.04 (*)     BUN/CREA Ratio 23.1 (*)     Calcium, Total 10.9 (*)     GFR, Non- 51 (*)     GFR, -American 58 (*)     All other components within normal limi Abbi Clemons MD on 2/29/2020 at 21:26     Approved by: Kenneth Macias MD on 2/29/2020 at 21:26          Ct Abdomen+pelvis(contrast Only)(cpt=74177)    Result Date: 2/29/2020  CONCLUSION:  No evidence for acute injury or hematoma in the abdomen or pelvis, or lowe

## 2020-03-01 NOTE — ED INITIAL ASSESSMENT (HPI)
Pt coming from [facility] fall this AM. Pt arrived crying screaming \"ow\" unable to describe or state what is hurting

## 2020-03-01 NOTE — ED PROVIDER NOTES
CT ABDOMEN+PELVIS(CONTRAST ONLY)(CPT=74177)   Final Result    PROCEDURE:  CT ABDOMEN+PELVIS (CONTRAST ONLY) (CPT=74177)         COMPARISON:  None.          INDICATIONS:  Pain after falling down         TECHNIQUE:  CT scanning was performed from the dome of or     pelvis, or lower chest.  Degenerative spine changes. No definite fracture     seen. Nonspecific low-attenuation lesions of the liver, largest measuring     1.7 cm. Etiology and chronicity     uncertain.               Dictated by: Sue Owens 21:26         Approved by: Sharmin Otoole MD on 2/29/2020 at 21:26               XR RIBS WITH CHEST (3 VIEWS), RIGHT  (CPT=71101)   Final Result    PROCEDURE:  XR RIBS WITH CHEST (3 VIEWS), RIGHT  (CPT=71101)         TECHNIQUE:  PA Chest and three views o

## 2020-03-10 ENCOUNTER — APPOINTMENT (OUTPATIENT)
Dept: GENERAL RADIOLOGY | Facility: HOSPITAL | Age: 80
DRG: 682 | End: 2020-03-10
Attending: EMERGENCY MEDICINE
Payer: MEDICARE

## 2020-03-10 ENCOUNTER — HOSPITAL ENCOUNTER (INPATIENT)
Facility: HOSPITAL | Age: 80
LOS: 3 days | Discharge: HOSPICE | DRG: 682 | End: 2020-03-13
Attending: EMERGENCY MEDICINE | Admitting: HOSPITALIST
Payer: MEDICARE

## 2020-03-10 DIAGNOSIS — E86.0 DEHYDRATION: Primary | ICD-10-CM

## 2020-03-10 DIAGNOSIS — N17.9 AKI (ACUTE KIDNEY INJURY) (HCC): ICD-10-CM

## 2020-03-10 DIAGNOSIS — E87.0 HYPERNATREMIA: ICD-10-CM

## 2020-03-10 LAB
ALBUMIN SERPL-MCNC: 3.6 G/DL (ref 3.4–5)
ALBUMIN/GLOB SERPL: 0.8 {RATIO} (ref 1–2)
ALP LIVER SERPL-CCNC: 78 U/L (ref 55–142)
ALT SERPL-CCNC: 47 U/L (ref 13–56)
ANION GAP SERPL CALC-SCNC: 8 MMOL/L (ref 0–18)
ANION GAP SERPL CALC-SCNC: <0 MMOL/L (ref 0–18)
APTT PPP: 24.1 SECONDS (ref 25.4–36.1)
AST SERPL-CCNC: 40 U/L (ref 15–37)
BASOPHILS # BLD AUTO: 0.07 X10(3) UL (ref 0–0.2)
BASOPHILS NFR BLD AUTO: 0.5 %
BILIRUB SERPL-MCNC: 0.6 MG/DL (ref 0.1–2)
BUN BLD-MCNC: 57 MG/DL (ref 7–18)
BUN BLD-MCNC: 61 MG/DL (ref 7–18)
BUN/CREAT SERPL: 44.5 (ref 10–20)
BUN/CREAT SERPL: 58.2 (ref 10–20)
CALCIUM BLD-MCNC: 10.9 MG/DL (ref 8.5–10.1)
CALCIUM BLD-MCNC: 9.9 MG/DL (ref 8.5–10.1)
CHLORIDE SERPL-SCNC: 120 MMOL/L (ref 98–112)
CHLORIDE SERPL-SCNC: 123 MMOL/L (ref 98–112)
CO2 SERPL-SCNC: 24 MMOL/L (ref 21–32)
CO2 SERPL-SCNC: 34 MMOL/L (ref 21–32)
CREAT BLD-MCNC: 0.98 MG/DL (ref 0.55–1.02)
CREAT BLD-MCNC: 1.37 MG/DL (ref 0.55–1.02)
DEPRECATED RDW RBC AUTO: 47.8 FL (ref 35.1–46.3)
EOSINOPHIL # BLD AUTO: 0.03 X10(3) UL (ref 0–0.7)
EOSINOPHIL NFR BLD AUTO: 0.2 %
ERYTHROCYTE [DISTWIDTH] IN BLOOD BY AUTOMATED COUNT: 13.4 % (ref 11–15)
GLOBULIN PLAS-MCNC: 4.4 G/DL (ref 2.8–4.4)
GLUCOSE BLD-MCNC: 84 MG/DL (ref 70–99)
GLUCOSE BLD-MCNC: 91 MG/DL (ref 70–99)
HCT VFR BLD AUTO: 54.6 % (ref 35–48)
HGB BLD-MCNC: 17.3 G/DL (ref 12–16)
IMM GRANULOCYTES # BLD AUTO: 0.05 X10(3) UL (ref 0–1)
IMM GRANULOCYTES NFR BLD: 0.4 %
INR BLD: 0.96 (ref 0.9–1.1)
LYMPHOCYTES # BLD AUTO: 1.46 X10(3) UL (ref 1–4)
LYMPHOCYTES NFR BLD AUTO: 11.4 %
M PROTEIN MFR SERPL ELPH: 8 G/DL (ref 6.4–8.2)
MCH RBC QN AUTO: 30.7 PG (ref 26–34)
MCHC RBC AUTO-ENTMCNC: 31.7 G/DL (ref 31–37)
MCV RBC AUTO: 96.8 FL (ref 80–100)
MONOCYTES # BLD AUTO: 1.31 X10(3) UL (ref 0.1–1)
MONOCYTES NFR BLD AUTO: 10.2 %
NEUTROPHILS # BLD AUTO: 9.94 X10 (3) UL (ref 1.5–7.7)
NEUTROPHILS # BLD AUTO: 9.94 X10(3) UL (ref 1.5–7.7)
NEUTROPHILS NFR BLD AUTO: 77.3 %
OSMOLALITY SERPL CALC.SUM OF ELEC: 330 MOSM/KG (ref 275–295)
OSMOLALITY SERPL CALC.SUM OF ELEC: 337 MOSM/KG (ref 275–295)
PLATELET # BLD AUTO: 315 10(3)UL (ref 150–450)
POTASSIUM SERPL-SCNC: 3.5 MMOL/L (ref 3.5–5.1)
POTASSIUM SERPL-SCNC: 3.7 MMOL/L (ref 3.5–5.1)
PSA SERPL DL<=0.01 NG/ML-MCNC: 13.2 SECONDS (ref 12.5–14.7)
RBC # BLD AUTO: 5.64 X10(6)UL (ref 3.8–5.3)
SODIUM SERPL-SCNC: 152 MMOL/L (ref 136–145)
SODIUM SERPL-SCNC: 156 MMOL/L (ref 136–145)
WBC # BLD AUTO: 12.9 X10(3) UL (ref 4–11)

## 2020-03-10 PROCEDURE — 99223 1ST HOSP IP/OBS HIGH 75: CPT | Performed by: INTERNAL MEDICINE

## 2020-03-10 PROCEDURE — 71045 X-RAY EXAM CHEST 1 VIEW: CPT | Performed by: EMERGENCY MEDICINE

## 2020-03-10 RX ORDER — SODIUM CHLORIDE 9 MG/ML
INJECTION, SOLUTION INTRAVENOUS CONTINUOUS
Status: ACTIVE | OUTPATIENT
Start: 2020-03-10 | End: 2020-03-10

## 2020-03-10 RX ORDER — HEPARIN SODIUM 5000 [USP'U]/ML
5000 INJECTION, SOLUTION INTRAVENOUS; SUBCUTANEOUS EVERY 8 HOURS SCHEDULED
Status: DISCONTINUED | OUTPATIENT
Start: 2020-03-10 | End: 2020-03-13

## 2020-03-10 RX ORDER — SODIUM CHLORIDE 9 MG/ML
1000 INJECTION, SOLUTION INTRAVENOUS ONCE
Status: COMPLETED | OUTPATIENT
Start: 2020-03-10 | End: 2020-03-10

## 2020-03-10 RX ORDER — LORAZEPAM 2 MG/ML
0.5 INJECTION INTRAMUSCULAR ONCE
Status: COMPLETED | OUTPATIENT
Start: 2020-03-10 | End: 2020-03-10

## 2020-03-10 RX ORDER — DIVALPROEX SODIUM 250 MG/1
500 TABLET, EXTENDED RELEASE ORAL 2 TIMES DAILY
Status: DISCONTINUED | OUTPATIENT
Start: 2020-03-10 | End: 2020-03-11

## 2020-03-10 RX ORDER — LORAZEPAM 2 MG/ML
0.5 INJECTION INTRAMUSCULAR
Status: DISCONTINUED | OUTPATIENT
Start: 2020-03-10 | End: 2020-03-13

## 2020-03-10 RX ORDER — ONDANSETRON 2 MG/ML
4 INJECTION INTRAMUSCULAR; INTRAVENOUS EVERY 6 HOURS PRN
Status: DISCONTINUED | OUTPATIENT
Start: 2020-03-10 | End: 2020-03-13

## 2020-03-10 RX ORDER — ZIPRASIDONE HYDROCHLORIDE 20 MG/1
20 CAPSULE ORAL EVERY EVENING
Status: DISCONTINUED | OUTPATIENT
Start: 2020-03-10 | End: 2020-03-11

## 2020-03-10 RX ORDER — METOCLOPRAMIDE HYDROCHLORIDE 5 MG/ML
5 INJECTION INTRAMUSCULAR; INTRAVENOUS EVERY 8 HOURS PRN
Status: DISCONTINUED | OUTPATIENT
Start: 2020-03-10 | End: 2020-03-11

## 2020-03-10 RX ORDER — ONDANSETRON 2 MG/ML
4 INJECTION INTRAMUSCULAR; INTRAVENOUS EVERY 4 HOURS PRN
Status: DISCONTINUED | OUTPATIENT
Start: 2020-03-10 | End: 2020-03-10

## 2020-03-10 RX ORDER — MAGNESIUM HYDROXIDE/ALUMINUM HYDROXICE/SIMETHICONE 120; 1200; 1200 MG/30ML; MG/30ML; MG/30ML
30 SUSPENSION ORAL EVERY 6 HOURS PRN
Status: DISCONTINUED | OUTPATIENT
Start: 2020-03-10 | End: 2020-03-13

## 2020-03-10 RX ORDER — ACETAMINOPHEN 325 MG/1
650 TABLET ORAL EVERY 6 HOURS PRN
Status: DISCONTINUED | OUTPATIENT
Start: 2020-03-10 | End: 2020-03-12

## 2020-03-10 RX ORDER — SODIUM CHLORIDE 9 MG/ML
INJECTION, SOLUTION INTRAVENOUS CONTINUOUS
Status: DISCONTINUED | OUTPATIENT
Start: 2020-03-10 | End: 2020-03-11

## 2020-03-10 RX ORDER — DRONABINOL 2.5 MG/1
10 CAPSULE ORAL 2 TIMES DAILY
Status: DISCONTINUED | OUTPATIENT
Start: 2020-03-10 | End: 2020-03-13

## 2020-03-10 NOTE — ED INITIAL ASSESSMENT (HPI)
Pt presents to ed with elevated kidney function per [facility], pt is at baseline mentation

## 2020-03-10 NOTE — ED NOTES
Resumed care of patient, report received from [facility]

## 2020-03-10 NOTE — H&P
[facility]PITAL[facility]  History and Physical     Cleo Corona Patient Status:  Emergency    3/1/1940 MRN [id number]   Location [address] [facility] Attending Neil Robbins MD   Hosp Day # 0 PCP Adriana Sheehan MD     Chief Complaint: MG/5ML Oral Suspension, Take 30 mL by mouth daily as needed for constipation. , Disp: , Rfl:   Alum & Mag Hydroxide-Simeth [phone] MG/5ML Oral Suspension, Take 30 mL by mouth every 6 (six) hours as needed for Indigestion. , Disp: , Rfl:         Review of 03/10/20  1056   PTP 13.2   INR 0.96       No results for input(s): TROP, CK in the last 168 hours. Imaging: Imaging data reviewed in Epic. ASSESSMENT / PLAN:     1. Acute Kidney Injury  1. IVF  2. Encourage PO intake  3. Monitor BMP  2.  Hypernatre

## 2020-03-10 NOTE — PLAN OF CARE
NURSING ADMISSION NOTE      Patient admitted via Cart  Oriented to room. Safety precautions initiated. Bed in low position. Call light in reach. [facility] PCT accompanied patient to the room. [employer] took over for [facility] PCT.      Patient candida

## 2020-03-10 NOTE — ED PROVIDER NOTES
Patient Seen in: [facility] Emergency Department      History   Patient presents with:  Abnormal Result    Stated Complaint: abnormal labs     HPI    Patient is an 78-year-old female presents emergency room with a history of being [facility] (Temporal)   Resp 14   Wt 46 kg   SpO2 98%   BMI 14.98 kg/m²         Physical Exam  GENERAL: Well-developed, well-nourished female sitting up breathing easily in no apparent distress. Patient is nontoxic in appearance.   HEENT: Head is normocephalic, atrau following components:    WBC 12.9 (*)     RBC 5.64 (*)     HGB 17.3 (*)     HCT 54.6 (*)     RDW-SD 47.8 (*)     Neutrophil Absolute Prelim 9.94 (*)     Neutrophil Absolute 9.94 (*)     Monocyte Absolute 1.31 (*)     All other components within normal limi [facility])    Disposition:  Admit  3/10/2020 12:47 pm    Follow-up:  No follow-up provider specified.       Medications Prescribed:  Current Discharge Medication List                   Present on Admission  Date Reviewed: 3/1/2020          ICD-10-CM Noted POA

## 2020-03-10 NOTE — PROGRESS NOTES
[facility] Pharmacy Note:  Renal Dose Adjustment for Metoclopramide (REGLAN)    [place] Smoker has been prescribed Metoclopramide (REGLAN) 10 mg every 8 hours as needed for nausea and vomiting.     Estimated Creatinine Clearance: 23 mL/min (A) (based on SCr of 1

## 2020-03-11 ENCOUNTER — APPOINTMENT (OUTPATIENT)
Dept: CT IMAGING | Facility: HOSPITAL | Age: 80
DRG: 682 | End: 2020-03-11
Attending: HOSPITALIST
Payer: MEDICARE

## 2020-03-11 ENCOUNTER — APPOINTMENT (OUTPATIENT)
Dept: GENERAL RADIOLOGY | Facility: HOSPITAL | Age: 80
DRG: 682 | End: 2020-03-11
Attending: HOSPITALIST
Payer: MEDICARE

## 2020-03-11 LAB
ANION GAP SERPL CALC-SCNC: 0 MMOL/L (ref 0–18)
ANION GAP SERPL CALC-SCNC: 2 MMOL/L (ref 0–18)
ANION GAP SERPL CALC-SCNC: 5 MMOL/L (ref 0–18)
BASOPHILS # BLD AUTO: 0.07 X10(3) UL (ref 0–0.2)
BASOPHILS NFR BLD AUTO: 0.8 %
BUN BLD-MCNC: 24 MG/DL (ref 7–18)
BUN BLD-MCNC: 31 MG/DL (ref 7–18)
BUN BLD-MCNC: 40 MG/DL (ref 7–18)
BUN/CREAT SERPL: 43.7 (ref 10–20)
BUN/CREAT SERPL: 45.3 (ref 10–20)
BUN/CREAT SERPL: 49.4 (ref 10–20)
CALCIUM BLD-MCNC: 8.4 MG/DL (ref 8.5–10.1)
CALCIUM BLD-MCNC: 9.3 MG/DL (ref 8.5–10.1)
CALCIUM BLD-MCNC: 9.6 MG/DL (ref 8.5–10.1)
CHLORIDE SERPL-SCNC: 116 MMOL/L (ref 98–112)
CHLORIDE SERPL-SCNC: 124 MMOL/L (ref 98–112)
CHLORIDE SERPL-SCNC: 126 MMOL/L (ref 98–112)
CO2 SERPL-SCNC: 27 MMOL/L (ref 21–32)
CO2 SERPL-SCNC: 29 MMOL/L (ref 21–32)
CO2 SERPL-SCNC: 33 MMOL/L (ref 21–32)
CREAT BLD-MCNC: 0.53 MG/DL (ref 0.55–1.02)
CREAT BLD-MCNC: 0.71 MG/DL (ref 0.55–1.02)
CREAT BLD-MCNC: 0.81 MG/DL (ref 0.55–1.02)
DEPRECATED RDW RBC AUTO: 48.9 FL (ref 35.1–46.3)
EOSINOPHIL # BLD AUTO: 0.17 X10(3) UL (ref 0–0.7)
EOSINOPHIL NFR BLD AUTO: 2 %
ERYTHROCYTE [DISTWIDTH] IN BLOOD BY AUTOMATED COUNT: 13.3 % (ref 11–15)
GLUCOSE BLD-MCNC: 87 MG/DL (ref 70–99)
GLUCOSE BLD-MCNC: 87 MG/DL (ref 70–99)
GLUCOSE BLD-MCNC: 94 MG/DL (ref 70–99)
HCT VFR BLD AUTO: 48.6 % (ref 35–48)
HGB BLD-MCNC: 15.1 G/DL (ref 12–16)
IMM GRANULOCYTES # BLD AUTO: 0.05 X10(3) UL (ref 0–1)
IMM GRANULOCYTES NFR BLD: 0.6 %
LYMPHOCYTES # BLD AUTO: 1.51 X10(3) UL (ref 1–4)
LYMPHOCYTES NFR BLD AUTO: 17.4 %
MCH RBC QN AUTO: 30.7 PG (ref 26–34)
MCHC RBC AUTO-ENTMCNC: 31.1 G/DL (ref 31–37)
MCV RBC AUTO: 98.8 FL (ref 80–100)
MONOCYTES # BLD AUTO: 0.79 X10(3) UL (ref 0.1–1)
MONOCYTES NFR BLD AUTO: 9.1 %
NEUTROPHILS # BLD AUTO: 6.09 X10 (3) UL (ref 1.5–7.7)
NEUTROPHILS # BLD AUTO: 6.09 X10(3) UL (ref 1.5–7.7)
NEUTROPHILS NFR BLD AUTO: 70.1 %
OSMOLALITY SERPL CALC.SUM OF ELEC: 307 MOSM/KG (ref 275–295)
OSMOLALITY SERPL CALC.SUM OF ELEC: 328 MOSM/KG (ref 275–295)
OSMOLALITY SERPL CALC.SUM OF ELEC: 338 MOSM/KG (ref 275–295)
PLATELET # BLD AUTO: 229 10(3)UL (ref 150–450)
POTASSIUM SERPL-SCNC: 3.7 MMOL/L (ref 3.5–5.1)
POTASSIUM SERPL-SCNC: 3.8 MMOL/L (ref 3.5–5.1)
POTASSIUM SERPL-SCNC: 4.8 MMOL/L (ref 3.5–5.1)
RBC # BLD AUTO: 4.92 X10(6)UL (ref 3.8–5.3)
SODIUM SERPL-SCNC: 147 MMOL/L (ref 136–145)
SODIUM SERPL-SCNC: 156 MMOL/L (ref 136–145)
SODIUM SERPL-SCNC: 159 MMOL/L (ref 136–145)
TSI SER-ACNC: 3.68 MIU/ML (ref 0.36–3.74)
VIT B12 SERPL-MCNC: 918 PG/ML (ref 193–986)
WBC # BLD AUTO: 8.7 X10(3) UL (ref 4–11)

## 2020-03-11 PROCEDURE — 74018 RADEX ABDOMEN 1 VIEW: CPT | Performed by: HOSPITALIST

## 2020-03-11 PROCEDURE — 90792 PSYCH DIAG EVAL W/MED SRVCS: CPT | Performed by: OTHER

## 2020-03-11 PROCEDURE — 70450 CT HEAD/BRAIN W/O DYE: CPT | Performed by: HOSPITALIST

## 2020-03-11 PROCEDURE — 99233 SBSQ HOSP IP/OBS HIGH 50: CPT | Performed by: HOSPITALIST

## 2020-03-11 PROCEDURE — 71045 X-RAY EXAM CHEST 1 VIEW: CPT | Performed by: HOSPITALIST

## 2020-03-11 RX ORDER — KETOROLAC TROMETHAMINE 15 MG/ML
15 INJECTION, SOLUTION INTRAMUSCULAR; INTRAVENOUS ONCE
Status: COMPLETED | OUTPATIENT
Start: 2020-03-11 | End: 2020-03-11

## 2020-03-11 RX ORDER — POTASSIUM CHLORIDE 14.9 MG/ML
20 INJECTION INTRAVENOUS ONCE
Status: COMPLETED | OUTPATIENT
Start: 2020-03-11 | End: 2020-03-12

## 2020-03-11 RX ORDER — KETOROLAC TROMETHAMINE 30 MG/ML
INJECTION, SOLUTION INTRAMUSCULAR; INTRAVENOUS
Status: DISPENSED
Start: 2020-03-11 | End: 2020-03-12

## 2020-03-11 RX ORDER — HYDROXYZINE HYDROCHLORIDE 25 MG/1
25 TABLET, FILM COATED ORAL 3 TIMES DAILY
Status: DISCONTINUED | OUTPATIENT
Start: 2020-03-11 | End: 2020-03-13

## 2020-03-11 RX ORDER — SODIUM CHLORIDE 450 MG/100ML
INJECTION, SOLUTION INTRAVENOUS CONTINUOUS
Status: DISCONTINUED | OUTPATIENT
Start: 2020-03-11 | End: 2020-03-12

## 2020-03-11 NOTE — PROGRESS NOTES
[facility]  Progress Note     General Mitchells Patient Status:  Inpatient    3/1/1940 MRN [id number]   [facility] 4NW-A Attending Raji South, [address] [place] [address] Day # 1 PCP Mica Bueno MD     Chief Complaint: poor po intake candida mg/dL). Recent Labs   Lab 03/10/20  1056   PTP 13.2   INR 0.96       No results for input(s): TROP, CK in the last 168 hours. Imaging: Imaging data reviewed in Epic.     Medications:   • divalproex Sodium ER  500 mg Oral BID   • dronabinol  10 mg

## 2020-03-11 NOTE — DIETARY MALNUTRITION NOTE
[facility]    NUTRITION INITIAL ASSESSMENT    Pt meets severe malnutrition criteria.     CRITERIA FOR MALNUTRITION DIAGNOSIS:  Criteria for severe malnutrition diagnosis: chronic illness related to energy intake less than 75% for greater than 1 month, RELATED PHYSICAL FINDINGS:     1. Body Fat/Muscle Mass: severe depletion body fat triceps region and moderate depletion muscle mass temple region per visual exam.    NUTRITION PRESCRIPTION:46 kg  Calories: 0475-0392 calories/day (35-37 calories per kg)  Pr

## 2020-03-11 NOTE — PLAN OF CARE
Pt drowsy but responsive. Incomprehensible speech. Pt restless and agitated. IV ativan given per MAR. Pt responded well to medication and able to rest in bed. Pt with two incontinent episodes- urine. Pt ate 1/2 applesauce cup with depakote.  Pt ate two bite appropriate  - Instruct patient on fluid and nutrition restrictions as appropriate  Outcome: Progressing     Problem: NEUROLOGICAL - ADULT  Goal: Achieves stable or improved neurological status  Description  INTERVENTIONS  - Assess for and report changes i

## 2020-03-11 NOTE — CM/SW NOTE
Pt has dementia and was at [facility] prior to admission. Not sure if she is going to return. Awaiting MD Irene Fatima. Will follow up in regards to this.

## 2020-03-11 NOTE — PLAN OF CARE
Awake, alert x0. Doesn't make eye contact with RN staff for very long. Very restless- not exactly agitated but constantly moving. Rested for a very short time. PRN medications administered with mild result. IVF adjusted per orders.  Repeat Na this after

## 2020-03-11 NOTE — CONSULTS
[facility]  Report of Psychiatric Consultation    Raquel Sites Patient Status:  Inpatient    3/1/1940 MRN [id number]   [facility] 4NW-A Attending Jayjay Tavarez MD   Hosp Day # 1 PCP Yunior Jaime MD     Date of Admission: 3/10/20 renetta Reyes ([phone]) recommending hospice at this time. Ashleigh Maura  3/11/2020  11:31 AM    History of Present Illness:  79 yo female with hx Lewy Body Dementia and PTSD was admitted for management of her CATE and hypernatremia.      She unintelligible. Staff reports that patient remains unable to verbalize location of pain or discomfort. Staff reports that patient appetite is severely impaired. Sleep is minimal, 1 to 2 hours at best each night.   Patient has been compliant with medicati UNKNOWN  Levaquin [Levofloxa*    UNKNOWN  Neomycin                UNKNOWN  Penicillins             UNKNOWN  Quinolones              UNKNOWN    Medications:    Current Facility-Administered Medications:   •  0.45% NaCl infusion, , Intravenous, Continuous  • 03/11/2020       STUDIES:    3/11/20  PROCEDURE:  CT BRAIN OR HEAD (59575)     COMPARISON:  [facility] CT, CT BRAIN OR HEAD (48637), 2/25/2020, 6:02 PM.     INDICATIONS:  hypernatremia     TECHNIQUE:  Noncontrast CT scanning is performed through the brain.  D

## 2020-03-12 LAB
ANION GAP SERPL CALC-SCNC: 2 MMOL/L (ref 0–18)
ANION GAP SERPL CALC-SCNC: 3 MMOL/L (ref 0–18)
BUN BLD-MCNC: 21 MG/DL (ref 7–18)
BUN BLD-MCNC: 23 MG/DL (ref 7–18)
BUN/CREAT SERPL: 32.3 (ref 10–20)
BUN/CREAT SERPL: 33.8 (ref 10–20)
CALCIUM BLD-MCNC: 9.3 MG/DL (ref 8.5–10.1)
CALCIUM BLD-MCNC: 9.7 MG/DL (ref 8.5–10.1)
CHLORIDE SERPL-SCNC: 113 MMOL/L (ref 98–112)
CHLORIDE SERPL-SCNC: 118 MMOL/L (ref 98–112)
CO2 SERPL-SCNC: 28 MMOL/L (ref 21–32)
CO2 SERPL-SCNC: 28 MMOL/L (ref 21–32)
CREAT BLD-MCNC: 0.65 MG/DL (ref 0.55–1.02)
CREAT BLD-MCNC: 0.68 MG/DL (ref 0.55–1.02)
GLUCOSE BLD-MCNC: 86 MG/DL (ref 70–99)
GLUCOSE BLD-MCNC: 99 MG/DL (ref 70–99)
OSMOLALITY SERPL CALC.SUM OF ELEC: 301 MOSM/KG (ref 275–295)
OSMOLALITY SERPL CALC.SUM OF ELEC: 309 MOSM/KG (ref 275–295)
POTASSIUM SERPL-SCNC: 3.8 MMOL/L (ref 3.5–5.1)
POTASSIUM SERPL-SCNC: 3.9 MMOL/L (ref 3.5–5.1)
POTASSIUM SERPL-SCNC: 3.9 MMOL/L (ref 3.5–5.1)
SODIUM SERPL-SCNC: 144 MMOL/L (ref 136–145)
SODIUM SERPL-SCNC: 148 MMOL/L (ref 136–145)
VALPROATE SERPL-MCNC: 59 UG/ML (ref 50–100)

## 2020-03-12 PROCEDURE — 90792 PSYCH DIAG EVAL W/MED SRVCS: CPT | Performed by: OTHER

## 2020-03-12 PROCEDURE — 99233 SBSQ HOSP IP/OBS HIGH 50: CPT | Performed by: HOSPITALIST

## 2020-03-12 RX ORDER — POTASSIUM CHLORIDE 14.9 MG/ML
20 INJECTION INTRAVENOUS ONCE
Status: COMPLETED | OUTPATIENT
Start: 2020-03-12 | End: 2020-03-13

## 2020-03-12 RX ORDER — KETOROLAC TROMETHAMINE 30 MG/ML
15 INJECTION, SOLUTION INTRAMUSCULAR; INTRAVENOUS 4 TIMES DAILY
Status: DISCONTINUED | OUTPATIENT
Start: 2020-03-12 | End: 2020-03-13

## 2020-03-12 RX ORDER — ACETAMINOPHEN 325 MG/1
650 TABLET ORAL 3 TIMES DAILY
Status: DISCONTINUED | OUTPATIENT
Start: 2020-03-12 | End: 2020-03-13

## 2020-03-12 RX ORDER — FENTANYL 12 UG/H
1 PATCH TRANSDERMAL
Status: DISCONTINUED | OUTPATIENT
Start: 2020-03-12 | End: 2020-03-13

## 2020-03-12 NOTE — PROGRESS NOTES
[facility]  Progress Note     Felecia Osuna Patient Status:  Inpatient    3/1/1940 MRN [id number]   [facility] 4NW-A Attending Radha Hicks MD   Hosp Day # 2 PCP Katelin Godoy MD     Chief Complaint: increased confusion    S: Pa TP 7.1 8.0  --   --   --   --     < > = values in this interval not displayed. Estimated Creatinine Clearance: 46.3 mL/min (based on SCr of 0.68 mg/dL).     Recent Labs   Lab 03/10/20  1056   PTP 13.2   INR 0.96       No results for input(s): TROP,

## 2020-03-12 NOTE — PROGRESS NOTES
[facility]  Report of Psychiatric Progress Note    Sara Arevalo Patient Status:  Inpatient    3/1/1940 MRN [id number]   [facility] 4NW-A Attending Brandi Robles MD   Hosp Day # 2 PCP Rosemarie Eric MD     Date of Admission: 3/10/20 on 2/26/20 by Dr. Davy Hoskins,   Merline Calabrese patient has had a recent decline in ability to carry out basic functions including getting dressed and significant issues with memory including remembering to use the bathroom, has become more combative, aggressive, hos discomfort and pain. Discussed titration in Marinol and Atarax to improve appetite, sleep, restlessness, anxious state. Discussed hospice level of aftercare. Looking into [place] lights. \"    Due to her lack of improvement and her continued yelling and p surgical history. Family History   Family history unknown: Yes      has an unknown smoking status. She has never used smokeless tobacco. She reports previous alcohol use. She reports previous drug use.     Allergies:    Demerol Hcl [Meperi*    UNKNOWN  Flu 03/12/2020    CO2 28.0 03/12/2020    GLU 86 03/12/2020    CA 9.3 03/12/2020    B12 918 03/11/2020       STUDIES:    3/11/20  PROCEDURE:  CT BRAIN OR HEAD (48541)     COMPARISON:  [facility]REG[facility]IO, CT BRAIN OR HEAD (11249), 2/25/2020, 6:02 PM.     INDICATIONS:

## 2020-03-12 NOTE — PLAN OF CARE
Pt alert but disoriented. Pt grabbing head, right abd/hip, and between legs saying \"Ow\". Pt crying at times. Restless. Pt given tylenol x2 doses this shift. Pt also given scheduled meds per MAR. Pt took meds with applesauce and cooperated well.  Pt ate tw Monitor intake, output and patient weight  - Monitor urine specific gravity, serum osmolarity and serum sodium as indicated or ordered  - Monitor response to interventions for patient's volume status, including labs, urine output, blood pressure (other richard

## 2020-03-12 NOTE — CM/SW NOTE
SW spoke w/Nohemi  from [facility] who stated she has already been in contact w/the pt's guardian and [facility] regarding dc plan. Pt to return there when she leaves the hospital. DME has already been delivered.  Hallie Waters stated the pt's g

## 2020-03-12 NOTE — PLAN OF CARE
Problem: GENITOURINARY - ADULT  Goal: Absence of urinary retention  Description  INTERVENTIONS:  - Assess patient’s ability to void and empty bladder  - Monitor intake/output and perform bladder scan as needed  - Follow urinary retention protocol/standar neurological status  - Initiate measures to prevent increased intracranial pressure  - Maintain blood pressure and fluid volume within ordered parameters to optimize cerebral perfusion and minimize risk of hemorrhage  - Monitor temperature, glucose, and so fairly good appetite this am for breakfast. No PO intake for lunch. Did take some of PO meds in applesauce. Unable  to tolerate marinol.        Problem: PAIN - ADULT  Goal: Verbalizes/displays adequate comfort level or patient's stated pain goal  Descriptio

## 2020-03-12 NOTE — CM/SW NOTE
SW left a message for pt's guardian Sweta regarding pt. Pt has Lewy body Dementia. Pt is from the [facility]. Notes indicate pt may return there w/hospice.

## 2020-03-13 VITALS
DIASTOLIC BLOOD PRESSURE: 87 MMHG | OXYGEN SATURATION: 95 % | RESPIRATION RATE: 12 BRPM | BODY MASS INDEX: 15 KG/M2 | TEMPERATURE: 99 F | SYSTOLIC BLOOD PRESSURE: 127 MMHG | HEART RATE: 90 BPM | WEIGHT: 101.44 LBS

## 2020-03-13 LAB — POTASSIUM SERPL-SCNC: 4.2 MMOL/L (ref 3.5–5.1)

## 2020-03-13 PROCEDURE — 99239 HOSP IP/OBS DSCHRG MGMT >30: CPT | Performed by: HOSPITALIST

## 2020-03-13 PROCEDURE — 99232 SBSQ HOSP IP/OBS MODERATE 35: CPT | Performed by: OTHER

## 2020-03-13 RX ORDER — MORPHINE SULFATE 4 MG/ML
8 INJECTION, SOLUTION INTRAMUSCULAR; INTRAVENOUS EVERY 2 HOUR PRN
Status: DISCONTINUED | OUTPATIENT
Start: 2020-03-13 | End: 2020-03-13

## 2020-03-13 RX ORDER — FENTANYL 12 UG/H
1 PATCH TRANSDERMAL
Qty: 10 PATCH | Refills: 0 | Status: SHIPPED | OUTPATIENT
Start: 2020-03-15

## 2020-03-13 RX ORDER — LORAZEPAM 2 MG/ML
1 INJECTION INTRAMUSCULAR EVERY 4 HOURS PRN
Status: DISCONTINUED | OUTPATIENT
Start: 2020-03-13 | End: 2020-03-13

## 2020-03-13 RX ORDER — DIVALPROEX SODIUM 500 MG/1
1000 TABLET, EXTENDED RELEASE ORAL NIGHTLY
Qty: 60 TABLET | Refills: 0 | Status: SHIPPED | OUTPATIENT
Start: 2020-03-13

## 2020-03-13 RX ORDER — LORAZEPAM 2 MG/ML
2 CONCENTRATE ORAL EVERY 4 HOURS PRN
Qty: 30 ML | Refills: 0 | Status: SHIPPED | OUTPATIENT
Start: 2020-03-13

## 2020-03-13 RX ORDER — LORAZEPAM 2 MG/ML
2 CONCENTRATE ORAL EVERY 4 HOURS PRN
Status: DISCONTINUED | OUTPATIENT
Start: 2020-03-13 | End: 2020-03-13

## 2020-03-13 RX ORDER — MORPHINE SULFATE 4 MG/ML
2 INJECTION, SOLUTION INTRAMUSCULAR; INTRAVENOUS EVERY 2 HOUR PRN
Status: DISCONTINUED | OUTPATIENT
Start: 2020-03-13 | End: 2020-03-13

## 2020-03-13 RX ORDER — NALOXONE HYDROCHLORIDE 4 MG/.1ML
4 SPRAY, METERED NASAL AS NEEDED
Qty: 1 KIT | Refills: 0 | Status: SHIPPED | OUTPATIENT
Start: 2020-03-13

## 2020-03-13 RX ORDER — MORPHINE SULFATE 4 MG/ML
4 INJECTION, SOLUTION INTRAMUSCULAR; INTRAVENOUS EVERY 2 HOUR PRN
Status: DISCONTINUED | OUTPATIENT
Start: 2020-03-13 | End: 2020-03-13

## 2020-03-13 RX ORDER — MORPHINE SULFATE 10 MG/.5ML
5 SOLUTION ORAL EVERY 4 HOURS PRN
Qty: 45 ML | Refills: 0 | Status: SHIPPED | OUTPATIENT
Start: 2020-03-13 | End: 2020-04-12

## 2020-03-13 RX ORDER — DIVALPROEX SODIUM 250 MG/1
1000 TABLET, EXTENDED RELEASE ORAL NIGHTLY
Status: DISCONTINUED | OUTPATIENT
Start: 2020-03-13 | End: 2020-03-13

## 2020-03-13 NOTE — CM/SW NOTE
SW spoke w/Agnes from [facility] who stated they are ready to take the pt back today. She would like the RN to fax the dc paperwork to [phone]. Will updated Robert Chaudhry from [facility]  and the pt's guardian.

## 2020-03-13 NOTE — PLAN OF CARE
Pt oriented to self. Very agitated, crying out and restless all shift. Ativan given once. Pt has not slept yet. Vitals stable. Afebrile. Nightly meds given with applesauce. Pt chewing on medications. Additional applesauce given.  Potassium replaced per prot Progressing  Goal: Hemodynamic stability and optimal renal function maintained  Description  INTERVENTIONS:  - Monitor labs and assess for signs and symptoms of volume excess or deficit  - Monitor intake, output and patient weight  - Monitor urine specific Enhance eating environment  Outcome: Progressing     Problem: PAIN - ADULT  Goal: Verbalizes/displays adequate comfort level or patient's stated pain goal  Description  INTERVENTIONS:  - Encourage pt to monitor pain and request assistance  - Assess pain us
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
## 2020-03-13 NOTE — PLAN OF CARE
Problem: GENITOURINARY - ADULT  Goal: Absence of urinary retention  Description  INTERVENTIONS:  - Assess patient’s ability to void and empty bladder  - Monitor intake/output and perform bladder scan as needed  - Follow urinary retention protocol/standar pressure  - Maintain blood pressure and fluid volume within ordered parameters to optimize cerebral perfusion and minimize risk of hemorrhage  - Monitor temperature, glucose, and sodium.  Initiate appropriate interventions as ordered  Outcome: Progressing

## 2020-03-16 NOTE — DISCHARGE SUMMARY
[facility]  DISCHARGE SUMMARY     Felecia Osuna Patient Status:  Inpatient    3/1/1940 MRN [id number]   [facility] 4NW-A Attending No att. providers found   Hosp Day # 3 PCP Katelin Godoy MD     Date of Admission: 3/10/2020  Matt MG/0.1ML Liqd      4 mg by Nasal route as needed. If patient remains unresponsive, repeat dose in other nostril 2-5 minutes after first dose.    Quantity:  1 kit  Refills:  0        CHANGE how you take these medications      Instructions Prescription detail bowel sounds. No rebound or guarding. Neurologic: No focal neurological deficits. Musculoskeletal: Moves all extremities. Extremities: No edema.   -----------------------------------------------------------------------------------------------  PATIENT D

## 2020-03-26 NOTE — CDS QUERY
Potential for Impaired Nutritional Status  DOCUMENTATION CLARIFICATION FORM  Dear Doctor Mariya Davenport information suggests potential for impaired nutritional status.  For accurate ICD-10-CM code assignment to reflect severity of illness and risk of mor

## 2021-04-30 ENCOUNTER — TELEPHONE (OUTPATIENT)
Dept: FAMILY MEDICINE | Age: 81
End: 2021-04-30

## (undated) NOTE — ED AVS SNAPSHOT
Ms. Genoveva Addison   MRN: [id number]    Department:  [facility] Emergency Department   Date of Visit:  2/29/2020           Disclosure     Insurance plans vary and the physician(s) referred by the ER may not be covered by your plan.  Please contac tell this physician (or your personal doctor if your instructions are to return to your personal doctor) about any new or lasting problems. The primary care or specialist physician will see patients referred from the [facility] Emergency Department.  Buzz Horner

## (undated) NOTE — IP AVS SNAPSHOT
Patient Demographics     Address  [address] Phone  [phone] [facility])  [phone] [facility]      Emergency Contact(s)     Name Relation Home Work [address] [phone]      Allergies Next dose due:  As needed  Notes to patient:  For pain      Take 5 mg by mouth every 4 (four) hours as needed.   Stop taking on:  April 12, 2020   Tabatha Lux MD         Naloxone HCl 4 MG/0.1ML Liqd  Next dose due:  As needed      4 mg by Nasal route as nee Order ID Medication Name Action Time Action Reason Comments    [id number] Heparin Sodium (Porcine) 5000 UNIT/ML injection 5,000 Units 03/13/20 1412 Given            RIGHT LOWER ABDOMEN     Order ID Medication Name Action Time Action Reason Comments    [id number] GFR, Non- 83 >=60 — Ronald Flowers   GFR, -American 96 >=60 — [employer] Performed By     The [place] Name Director Address Valid Date Range    [facility] [address] Nirali Hicks  S.  Tiffany Bragg Family History   Family history unknown: Yes[AR.2]        Allergies:[AR.1]   Demerol Hcl [Meperi*    UNKNOWN  Fluphenazine            UNKNOWN  Levaquin [Levofloxa*    UNKNOWN  Neomycin                UNKNOWN  Penicillins             UNKNOWN  Quinolones Musculoskeletal: Moves all extremities. Extremities: No edema or cyanosis. Integument: No rashes or lesions. Psychiatric: Appropriate mood and affect.       Diagnostic Data:      Labs:[AR.1]  Recent Labs   Lab 03/09/20  0706 03/10/20  0718 03/10/20  105 Filed:  3/11/2020  1:34 PM Date of Service:  3/11/2020 11:30 AM Status:  Addendum    :  Divine Baxter MD (Physician)    Related Notes:  Original Note by Divine Baxter MD (Physician) filed at 3/11/2020 12:48 PM       [facility]  Report of Psychi She has been on Risperdal, Seroquel, and Geodon in the past without improvement in agitation. DC Reglan PRN since it is also a dopamine blocker. 6) Continue Marinol 10mg twice a day for agitation. Give if able to swallow safely.     7) Toradol 15mg IV x but did not help with agitation. [RH.4] Rozerum 8mg nightly[RH. 1] was used for insomnia, but didn't help. She slept only a few hours every 24 hrs. She was also on A[RH.4]tivan 1mg po/IM every 4 hrs prn[RH.1] agitation. As per[RH.4] 3/9/20[RH.1] note[RH. Social and Developmental History:  in 1/2018[RH.1]. She was in [facility]. [RH.4]     Past Medical History:   Diagnosis Date   • Anxiety    • Asthma    • Dementia ([address]    • Depression    • Thyroid disease    • Thyrotoxicosis      History rev Mood:[RH.1] unable to obtain[RH.4]  Affect:[RH.1] confused[RH.4]    Thought process:[RH.1] unable to assess[RH.4]  Thought content:[RH.1] unable to obtain[RH.4]    Orientation:[RH.1] unable to assess[RH.4]  Attention and Concentration[RH.1]  poor[RH.4]  Me RH.1 - Feliz Bui MD on 3/11/2020 11:30 AM  RH. 2 - Feliz Bui MD on 3/11/2020 11:31 AM  RH. 3 - Feliz Bui MD on 3/11/2020  1:26 PM  RH. 4 - Feliz Bui MD on 3/11/2020 12:20 PM                     D/C Summary    No notes of this type exist for th